# Patient Record
Sex: MALE | Race: BLACK OR AFRICAN AMERICAN | NOT HISPANIC OR LATINO | Employment: OTHER | URBAN - METROPOLITAN AREA
[De-identification: names, ages, dates, MRNs, and addresses within clinical notes are randomized per-mention and may not be internally consistent; named-entity substitution may affect disease eponyms.]

---

## 2023-04-26 LAB
ALANINE AMINOTRANSFERASE (SGPT) (U/L) IN SER/PLAS: 14 U/L (ref 10–52)
ALBUMIN (G/DL) IN SER/PLAS: 4.3 G/DL (ref 3.4–5)
ALKALINE PHOSPHATASE (U/L) IN SER/PLAS: 122 U/L (ref 33–136)
ANION GAP IN SER/PLAS: 9 MMOL/L (ref 10–20)
ASPARTATE AMINOTRANSFERASE (SGOT) (U/L) IN SER/PLAS: 15 U/L (ref 9–39)
BILIRUBIN TOTAL (MG/DL) IN SER/PLAS: 0.6 MG/DL (ref 0–1.2)
CALCIDIOL (25 OH VITAMIN D3) (NG/ML) IN SER/PLAS: 43 NG/ML
CALCIUM (MG/DL) IN SER/PLAS: 10.1 MG/DL (ref 8.6–10.6)
CARBON DIOXIDE, TOTAL (MMOL/L) IN SER/PLAS: 32 MMOL/L (ref 21–32)
CHLORIDE (MMOL/L) IN SER/PLAS: 106 MMOL/L (ref 98–107)
CHOLESTEROL (MG/DL) IN SER/PLAS: 146 MG/DL (ref 0–199)
CHOLESTEROL IN HDL (MG/DL) IN SER/PLAS: 52.7 MG/DL
CHOLESTEROL/HDL RATIO: 2.8
CREATININE (MG/DL) IN SER/PLAS: 1.25 MG/DL (ref 0.5–1.3)
ERYTHROCYTE DISTRIBUTION WIDTH (RATIO) BY AUTOMATED COUNT: 14.6 % (ref 11.5–14.5)
ERYTHROCYTE MEAN CORPUSCULAR HEMOGLOBIN CONCENTRATION (G/DL) BY AUTOMATED: 32.5 G/DL (ref 32–36)
ERYTHROCYTE MEAN CORPUSCULAR VOLUME (FL) BY AUTOMATED COUNT: 91 FL (ref 80–100)
ERYTHROCYTES (10*6/UL) IN BLOOD BY AUTOMATED COUNT: 4.64 X10E12/L (ref 4.5–5.9)
GFR MALE: 63 ML/MIN/1.73M2
GLUCOSE (MG/DL) IN SER/PLAS: 88 MG/DL (ref 74–99)
HEMATOCRIT (%) IN BLOOD BY AUTOMATED COUNT: 42.4 % (ref 41–52)
HEMOGLOBIN (G/DL) IN BLOOD: 13.8 G/DL (ref 13.5–17.5)
LDL: 77 MG/DL (ref 0–99)
LEUKOCYTES (10*3/UL) IN BLOOD BY AUTOMATED COUNT: 6.6 X10E9/L (ref 4.4–11.3)
NRBC (PER 100 WBCS) BY AUTOMATED COUNT: 0 /100 WBC (ref 0–0)
PLATELETS (10*3/UL) IN BLOOD AUTOMATED COUNT: 231 X10E9/L (ref 150–450)
POTASSIUM (MMOL/L) IN SER/PLAS: 4.3 MMOL/L (ref 3.5–5.3)
PROTEIN TOTAL: 7.4 G/DL (ref 6.4–8.2)
SODIUM (MMOL/L) IN SER/PLAS: 143 MMOL/L (ref 136–145)
TRIGLYCERIDE (MG/DL) IN SER/PLAS: 83 MG/DL (ref 0–149)
UREA NITROGEN (MG/DL) IN SER/PLAS: 14 MG/DL (ref 6–23)
VLDL: 17 MG/DL (ref 0–40)

## 2023-11-17 ENCOUNTER — OFFICE VISIT (OUTPATIENT)
Dept: PRIMARY CARE | Facility: CLINIC | Age: 68
End: 2023-11-17
Payer: MEDICARE

## 2023-11-17 VITALS
DIASTOLIC BLOOD PRESSURE: 67 MMHG | OXYGEN SATURATION: 96 % | BODY MASS INDEX: 21.98 KG/M2 | HEIGHT: 68 IN | WEIGHT: 145 LBS | SYSTOLIC BLOOD PRESSURE: 101 MMHG | HEART RATE: 62 BPM

## 2023-11-17 DIAGNOSIS — I10 HYPERTENSION, UNSPECIFIED TYPE: ICD-10-CM

## 2023-11-17 DIAGNOSIS — E78.5 HYPERLIPIDEMIA, UNSPECIFIED HYPERLIPIDEMIA TYPE: ICD-10-CM

## 2023-11-17 DIAGNOSIS — Z00.00 WELLNESS EXAMINATION: Primary | ICD-10-CM

## 2023-11-17 DIAGNOSIS — D64.9 CHRONIC ANEMIA: ICD-10-CM

## 2023-11-17 DIAGNOSIS — N52.9 ERECTILE DYSFUNCTION, UNSPECIFIED ERECTILE DYSFUNCTION TYPE: ICD-10-CM

## 2023-11-17 DIAGNOSIS — I11.9 HYPERTENSIVE HEART DISEASE WITHOUT HEART FAILURE: ICD-10-CM

## 2023-11-17 DIAGNOSIS — F17.200 NICOTINE USE DISORDER: ICD-10-CM

## 2023-11-17 DIAGNOSIS — E55.9 VITAMIN D DEFICIENCY: ICD-10-CM

## 2023-11-17 DIAGNOSIS — F17.210 NICOTINE DEPENDENCE, CIGARETTES, UNCOMPLICATED: ICD-10-CM

## 2023-11-17 DIAGNOSIS — Z00.00 MEDICARE ANNUAL WELLNESS VISIT, SUBSEQUENT: ICD-10-CM

## 2023-11-17 DIAGNOSIS — Z12.5 SCREENING FOR PROSTATE CANCER: ICD-10-CM

## 2023-11-17 PROBLEM — M50.20 HERNIATED CERVICAL DISC: Status: ACTIVE | Noted: 2023-11-17

## 2023-11-17 PROBLEM — H61.23 IMPACTED CERUMEN OF BOTH EARS: Status: ACTIVE | Noted: 2023-11-17

## 2023-11-17 PROBLEM — H90.A21 SENSORINEURAL HEARING LOSS (SNHL) OF RIGHT EAR WITH RESTRICTED HEARING OF LEFT EAR: Status: ACTIVE | Noted: 2023-11-17

## 2023-11-17 PROBLEM — M54.50 LOWER BACK PAIN: Status: ACTIVE | Noted: 2023-11-17

## 2023-11-17 PROCEDURE — G0439 PPPS, SUBSEQ VISIT: HCPCS | Performed by: STUDENT IN AN ORGANIZED HEALTH CARE EDUCATION/TRAINING PROGRAM

## 2023-11-17 PROCEDURE — 1126F AMNT PAIN NOTED NONE PRSNT: CPT | Performed by: STUDENT IN AN ORGANIZED HEALTH CARE EDUCATION/TRAINING PROGRAM

## 2023-11-17 PROCEDURE — 1170F FXNL STATUS ASSESSED: CPT | Performed by: STUDENT IN AN ORGANIZED HEALTH CARE EDUCATION/TRAINING PROGRAM

## 2023-11-17 PROCEDURE — 1159F MED LIST DOCD IN RCRD: CPT | Performed by: STUDENT IN AN ORGANIZED HEALTH CARE EDUCATION/TRAINING PROGRAM

## 2023-11-17 PROCEDURE — 3078F DIAST BP <80 MM HG: CPT | Performed by: STUDENT IN AN ORGANIZED HEALTH CARE EDUCATION/TRAINING PROGRAM

## 2023-11-17 PROCEDURE — 1160F RVW MEDS BY RX/DR IN RCRD: CPT | Performed by: STUDENT IN AN ORGANIZED HEALTH CARE EDUCATION/TRAINING PROGRAM

## 2023-11-17 PROCEDURE — 3074F SYST BP LT 130 MM HG: CPT | Performed by: STUDENT IN AN ORGANIZED HEALTH CARE EDUCATION/TRAINING PROGRAM

## 2023-11-17 PROCEDURE — 99397 PER PM REEVAL EST PAT 65+ YR: CPT | Performed by: STUDENT IN AN ORGANIZED HEALTH CARE EDUCATION/TRAINING PROGRAM

## 2023-11-17 RX ORDER — ATORVASTATIN CALCIUM 40 MG/1
40 TABLET, FILM COATED ORAL DAILY
COMMUNITY
End: 2024-01-27

## 2023-11-17 RX ORDER — DILTIAZEM HYDROCHLORIDE 240 MG/1
240 CAPSULE, COATED, EXTENDED RELEASE ORAL DAILY
COMMUNITY
Start: 2023-07-04 | End: 2024-01-30

## 2023-11-17 RX ORDER — LISINOPRIL 10 MG/1
10 TABLET ORAL DAILY
COMMUNITY
End: 2023-11-17 | Stop reason: SINTOL

## 2023-11-17 ASSESSMENT — ACTIVITIES OF DAILY LIVING (ADL)
MANAGING_FINANCES: INDEPENDENT
DOING_HOUSEWORK: INDEPENDENT
TAKING_MEDICATION: INDEPENDENT
GROCERY_SHOPPING: INDEPENDENT
BATHING: INDEPENDENT
DRESSING: INDEPENDENT

## 2023-11-17 ASSESSMENT — ENCOUNTER SYMPTOMS
OCCASIONAL FEELINGS OF UNSTEADINESS: 0
LOSS OF SENSATION IN FEET: 0
DEPRESSION: 0

## 2023-11-17 ASSESSMENT — PATIENT HEALTH QUESTIONNAIRE - PHQ9
2. FEELING DOWN, DEPRESSED OR HOPELESS: NOT AT ALL
1. LITTLE INTEREST OR PLEASURE IN DOING THINGS: NOT AT ALL
SUM OF ALL RESPONSES TO PHQ9 QUESTIONS 1 AND 2: 0

## 2023-11-17 NOTE — PROGRESS NOTES
"Subjective   Reason for Visit: Hector Otoole Jr. is an 68 y.o. male here for a Medicare Wellness visit.          Reviewed all medications by prescribing practitioner or clinical pharmacist (such as prescriptions, OTCs, herbal therapies and supplements) and documented in the medical record.    HPI    Patient Care Team:  Christopher D'Amico, DO as PCP - General (Family Medicine)     Review of Systems    Objective   Vitals:  /67   Pulse 62   Ht 1.727 m (5' 8\")   Wt 65.8 kg (145 lb)   SpO2 96%   BMI 22.05 kg/m²       Physical Exam    Assessment/Plan   Problem List Items Addressed This Visit    None           HPI: 68-year-old male presenting to establish care, Medicare annual wellness exam/CPE.  Patient doing relatively well without any new concerns or interval changes.    HTN, HLD, hypertensive heart disease, LVH  Following with cardiology.  On minimal medication regimen, has actually been decreasing blood pressure regimen well maintaining good readings.  Denies chest pain or shortness of breath.    Chronic anemia  Stable, asymptomatic    Erectile dysfunction  Not medically treated, reports not having issues.    SocHx:   - Smoking: Approximately 30-pack-year history, still smoking, only several cigarettes a week    Denies HA, changes in vision, chest pain, SOB/KELLEY, palpitations, N/V/D/C, dysuria/hematuria, hematochezia/melena, paresthesias, LE edema.    12 point ROS reviewed and negative other than as stated in HPI      General: Alert, oriented, pleasant, in no acute distress  HEENT:      Head: normocephalic, atraumatic;      eyes: EOMI, no scleral icterus;   Neck: soft, supple, non-tender, no masses appreciated  CV: Heart with regular rate and rhythm, normal S1/S2, no murmurs  Lungs: CTAB without wheezing, rhonchi or rales; good respiratory effort, no increased work of breathing  Abdomen: soft, non-tender, non-distended, no masses appreciated  Extremities: no edema, no cyanosis  MSK: ROM of upper and " lower extremities intact; strength 5/5 upper and lower extremities  Neuro: Cranial nerves grossly intact; alert and oriented, normal gait  Psych: Appropriate mood and affect    #HM  -CBC, CMP, Lipid panel, Vit D, TSH with reflex T4, PSA  -Vaccines:       Flu: declines      Shingrix: Recommended, advised to go to local pharmacy      Pneumococcal:      Tdap: 2020  -Lung cancer screening with low-dose CT: approximately 30 pack years  -Colonoscopy: 2021, 5 year plan  -AAA screening: ordered    #HTN #hypertensive heart disease #LVH  -Continue diltiazem 240 mg daily  - Continue to follow cardiology    #HLD  -Continue atorvastatin 40 mg daily  - Repeat lipid panel    #Chronic anemia  -Repeat CBC    #Erectile dysfunction  -Not medicated, currently stable and content    Smoking and tobacco cessation counseling; 3-10min    I spent more than 3 minutes (but less than 10 minutes) counseling patient about need for smoking/tobacco cessation and how I can support efforts when patient is ready to quit.  Discussed nicotine replacement therapy, Varenicline, Bupropion, hypnosis, support groups, and acupuncture as potential options.  Patient currently has no signs or symptoms of tobacco related disease.    F/U 6-12 months    Chris D'Amico, DO

## 2023-12-13 ENCOUNTER — HOSPITAL ENCOUNTER (OUTPATIENT)
Dept: VASCULAR MEDICINE | Facility: CLINIC | Age: 68
Discharge: HOME | End: 2023-12-13
Payer: MEDICARE

## 2023-12-13 DIAGNOSIS — I71.43 INFRARENAL ABDOMINAL AORTIC ANEURYSM (AAA) WITHOUT RUPTURE (CMS-HCC): Primary | ICD-10-CM

## 2023-12-13 DIAGNOSIS — I71.40 ABDOMINAL AORTIC ANEURYSM, WITHOUT RUPTURE, UNSPECIFIED (CMS-HCC): ICD-10-CM

## 2023-12-13 DIAGNOSIS — F17.200 NICOTINE USE DISORDER: ICD-10-CM

## 2023-12-13 DIAGNOSIS — Z13.6 ENCOUNTER FOR SCREENING FOR CARDIOVASCULAR DISORDERS: ICD-10-CM

## 2023-12-13 PROCEDURE — 76706 US ABDL AORTA SCREEN AAA: CPT | Performed by: SURGERY

## 2023-12-13 PROCEDURE — 76706 US ABDL AORTA SCREEN AAA: CPT

## 2023-12-14 ENCOUNTER — ANCILLARY PROCEDURE (OUTPATIENT)
Dept: RADIOLOGY | Facility: CLINIC | Age: 68
End: 2023-12-14
Payer: MEDICARE

## 2023-12-14 DIAGNOSIS — F17.200 NICOTINE USE DISORDER: ICD-10-CM

## 2023-12-14 DIAGNOSIS — F17.210 NICOTINE DEPENDENCE, CIGARETTES, UNCOMPLICATED: ICD-10-CM

## 2023-12-14 PROCEDURE — 71271 CT THORAX LUNG CANCER SCR C-: CPT

## 2023-12-14 PROCEDURE — 71271 CT THORAX LUNG CANCER SCR C-: CPT | Performed by: RADIOLOGY

## 2023-12-14 NOTE — LETTER
February 5, 2024     Hector ELIDA Otoole Jr.  [address] [facility] [address]  [place] [address]      Dear Mr. Otoole:    Below are the results from your recent visit:      CT showed smoking-related changes, but no worrisome pulmonary nodules.  Advised to continue with annual screenings.     There is a mildly aneurysmal ascending aorta that should be rechecked at next screening.  Further, densely calcified coronary arteries, continue to follow with Dr. Lang and see if further evaluation with stress testing is necessary.       If you have any questions or concerns, please don't hesitate to call.

## 2023-12-15 NOTE — RESULT ENCOUNTER NOTE
Lung cancer screening showed no worrisome pulmonary nodules, though there is mild underlying smoking-related lung disease.  Continue with annual screenings.    Borderline enlarged ascending aorta, densely calcified coronary arteries.  Patient should follow-up with his cardiologist Dr. Lang, to see if further testing is needed such as stress test.  I will defer to cardiology.  He does not have an appointment currently, so please advise him to get scheduled, help if needed.

## 2024-01-17 ENCOUNTER — APPOINTMENT (OUTPATIENT)
Dept: VASCULAR SURGERY | Facility: HOSPITAL | Age: 69
End: 2024-01-17
Payer: MEDICARE

## 2024-01-25 NOTE — RESULT ENCOUNTER NOTE
CT showed smoking-related changes, but no worrisome pulmonary nodules.  Advised to continue with annual screenings.    There is a mildly aneurysmal ascending aorta that should be rechecked at next screening.  Further, densely calcified coronary arteries, continue to follow with Dr. Lang and see if further evaluation with stress testing is necessary.

## 2024-01-26 DIAGNOSIS — E78.5 HYPERLIPIDEMIA, UNSPECIFIED: ICD-10-CM

## 2024-01-27 RX ORDER — ATORVASTATIN CALCIUM 40 MG/1
40 TABLET, FILM COATED ORAL DAILY
Qty: 90 TABLET | Refills: 3 | Status: SHIPPED | OUTPATIENT
Start: 2024-01-27

## 2024-01-30 DIAGNOSIS — I10 ESSENTIAL (PRIMARY) HYPERTENSION: ICD-10-CM

## 2024-01-30 RX ORDER — DILTIAZEM HYDROCHLORIDE 240 MG/1
240 CAPSULE, COATED, EXTENDED RELEASE ORAL DAILY
Qty: 90 CAPSULE | Refills: 3 | Status: SHIPPED | OUTPATIENT
Start: 2024-01-30

## 2024-02-07 ENCOUNTER — APPOINTMENT (OUTPATIENT)
Dept: VASCULAR SURGERY | Facility: HOSPITAL | Age: 69
End: 2024-02-07
Payer: MEDICARE

## 2024-02-13 ENCOUNTER — OFFICE VISIT (OUTPATIENT)
Dept: PRIMARY CARE | Facility: CLINIC | Age: 69
End: 2024-02-13
Payer: MEDICARE

## 2024-02-13 VITALS
DIASTOLIC BLOOD PRESSURE: 76 MMHG | BODY MASS INDEX: 21.98 KG/M2 | SYSTOLIC BLOOD PRESSURE: 124 MMHG | OXYGEN SATURATION: 96 % | WEIGHT: 145 LBS | HEART RATE: 82 BPM | HEIGHT: 68 IN

## 2024-02-13 DIAGNOSIS — I10 HYPERTENSION, UNSPECIFIED TYPE: Primary | ICD-10-CM

## 2024-02-13 DIAGNOSIS — E78.5 HYPERLIPIDEMIA, UNSPECIFIED HYPERLIPIDEMIA TYPE: ICD-10-CM

## 2024-02-13 DIAGNOSIS — D64.9 CHRONIC ANEMIA: ICD-10-CM

## 2024-02-13 DIAGNOSIS — N52.9 ERECTILE DYSFUNCTION, UNSPECIFIED ERECTILE DYSFUNCTION TYPE: ICD-10-CM

## 2024-02-13 PROCEDURE — 3078F DIAST BP <80 MM HG: CPT | Performed by: STUDENT IN AN ORGANIZED HEALTH CARE EDUCATION/TRAINING PROGRAM

## 2024-02-13 PROCEDURE — 1126F AMNT PAIN NOTED NONE PRSNT: CPT | Performed by: STUDENT IN AN ORGANIZED HEALTH CARE EDUCATION/TRAINING PROGRAM

## 2024-02-13 PROCEDURE — 1160F RVW MEDS BY RX/DR IN RCRD: CPT | Performed by: STUDENT IN AN ORGANIZED HEALTH CARE EDUCATION/TRAINING PROGRAM

## 2024-02-13 PROCEDURE — G2211 COMPLEX E/M VISIT ADD ON: HCPCS | Performed by: STUDENT IN AN ORGANIZED HEALTH CARE EDUCATION/TRAINING PROGRAM

## 2024-02-13 PROCEDURE — 3074F SYST BP LT 130 MM HG: CPT | Performed by: STUDENT IN AN ORGANIZED HEALTH CARE EDUCATION/TRAINING PROGRAM

## 2024-02-13 PROCEDURE — 1159F MED LIST DOCD IN RCRD: CPT | Performed by: STUDENT IN AN ORGANIZED HEALTH CARE EDUCATION/TRAINING PROGRAM

## 2024-02-13 PROCEDURE — 99214 OFFICE O/P EST MOD 30 MIN: CPT | Performed by: STUDENT IN AN ORGANIZED HEALTH CARE EDUCATION/TRAINING PROGRAM

## 2024-02-13 ASSESSMENT — PATIENT HEALTH QUESTIONNAIRE - PHQ9
SUM OF ALL RESPONSES TO PHQ9 QUESTIONS 1 AND 2: 0
1. LITTLE INTEREST OR PLEASURE IN DOING THINGS: NOT AT ALL
2. FEELING DOWN, DEPRESSED OR HOPELESS: NOT AT ALL

## 2024-02-13 ASSESSMENT — COLUMBIA-SUICIDE SEVERITY RATING SCALE - C-SSRS
6. HAVE YOU EVER DONE ANYTHING, STARTED TO DO ANYTHING, OR PREPARED TO DO ANYTHING TO END YOUR LIFE?: NO
1. IN THE PAST MONTH, HAVE YOU WISHED YOU WERE DEAD OR WISHED YOU COULD GO TO SLEEP AND NOT WAKE UP?: NO
2. HAVE YOU ACTUALLY HAD ANY THOUGHTS OF KILLING YOURSELF?: NO

## 2024-02-13 ASSESSMENT — ENCOUNTER SYMPTOMS
OCCASIONAL FEELINGS OF UNSTEADINESS: 0
LOSS OF SENSATION IN FEET: 0
DEPRESSION: 0

## 2024-02-13 NOTE — PROGRESS NOTES
69-year-old male presenting for routine follow-up on chronic conditions:    HTN, HLD, hypertensive heart disease, LVH  Following with cardiology.  On minimal medication regimen, and doing well.  Asymptomatic.    Chronic anemia  Stable, asymptomatic    Erectile dysfunction  Not medically treated, reports not having issues.    SocHx:   - Smoking: Approximately 30-pack-year history, still smoking, only several cigarettes a week    Denies HA, changes in vision, chest pain, SOB/KELLEY, palpitations, N/V/D/C, dysuria/hematuria, hematochezia/melena, paresthesias, LE edema.    12 point ROS reviewed and negative other than as stated in HPI      General: Alert, oriented, pleasant, in no acute distress  HEENT:      Head: normocephalic, atraumatic;      eyes: EOMI, no scleral icterus;   Neck: soft, supple, non-tender, no masses appreciated  CV: Heart with regular rate and rhythm, normal S1/S2, no murmurs  Lungs: CTAB without wheezing, rhonchi or rales; good respiratory effort, no increased work of breathing  Extremities: no edema, no cyanosis  Neuro: Cranial nerves grossly intact; alert and oriented, normal gait  Psych: Appropriate mood and affect    #HTN #Hypertensive heart disease #LVH  -BP at goal in office  -Continue diltiazem 240 mg daily  - Continue to follow cardiology    #HLD  -Continue atorvastatin 40 mg daily  - Repeat lipid panel    #Chronic anemia  -Repeat CBC    #Erectile dysfunction  -Not medicated, currently stable and content    F/U follow-up 4-5 months, Medicare in November    Chris D'Amico, DO

## 2024-02-21 ENCOUNTER — LAB (OUTPATIENT)
Dept: LAB | Facility: LAB | Age: 69
End: 2024-02-21
Payer: MEDICARE

## 2024-02-21 ENCOUNTER — OFFICE VISIT (OUTPATIENT)
Dept: VASCULAR SURGERY | Facility: HOSPITAL | Age: 69
End: 2024-02-21
Payer: MEDICARE

## 2024-02-21 VITALS
SYSTOLIC BLOOD PRESSURE: 143 MMHG | WEIGHT: 145.8 LBS | BODY MASS INDEX: 22.88 KG/M2 | HEART RATE: 87 BPM | HEIGHT: 67 IN | DIASTOLIC BLOOD PRESSURE: 83 MMHG

## 2024-02-21 DIAGNOSIS — I11.9 HYPERTENSIVE HEART DISEASE WITHOUT HEART FAILURE: ICD-10-CM

## 2024-02-21 DIAGNOSIS — Z12.5 SCREENING FOR PROSTATE CANCER: ICD-10-CM

## 2024-02-21 DIAGNOSIS — D64.9 CHRONIC ANEMIA: ICD-10-CM

## 2024-02-21 DIAGNOSIS — N52.9 ERECTILE DYSFUNCTION, UNSPECIFIED ERECTILE DYSFUNCTION TYPE: ICD-10-CM

## 2024-02-21 DIAGNOSIS — I10 HYPERTENSION, UNSPECIFIED TYPE: ICD-10-CM

## 2024-02-21 DIAGNOSIS — I71.43 INFRARENAL ABDOMINAL AORTIC ANEURYSM (AAA) WITHOUT RUPTURE (CMS-HCC): ICD-10-CM

## 2024-02-21 DIAGNOSIS — E78.5 HYPERLIPIDEMIA, UNSPECIFIED HYPERLIPIDEMIA TYPE: ICD-10-CM

## 2024-02-21 DIAGNOSIS — F17.200 NICOTINE USE DISORDER: ICD-10-CM

## 2024-02-21 DIAGNOSIS — E55.9 VITAMIN D DEFICIENCY: ICD-10-CM

## 2024-02-21 LAB
25(OH)D3 SERPL-MCNC: 36 NG/ML (ref 30–100)
ALBUMIN SERPL BCP-MCNC: 4.5 G/DL (ref 3.4–5)
ALP SERPL-CCNC: 123 U/L (ref 33–136)
ALT SERPL W P-5'-P-CCNC: 13 U/L (ref 10–52)
ANION GAP SERPL CALC-SCNC: 13 MMOL/L (ref 10–20)
AST SERPL W P-5'-P-CCNC: 14 U/L (ref 9–39)
BILIRUB SERPL-MCNC: 0.7 MG/DL (ref 0–1.2)
BUN SERPL-MCNC: 14 MG/DL (ref 6–23)
CALCIUM SERPL-MCNC: 10.1 MG/DL (ref 8.6–10.6)
CHLORIDE SERPL-SCNC: 108 MMOL/L (ref 98–107)
CHOLEST SERPL-MCNC: 150 MG/DL (ref 0–199)
CHOLESTEROL/HDL RATIO: 2.5
CO2 SERPL-SCNC: 27 MMOL/L (ref 21–32)
CREAT SERPL-MCNC: 1.03 MG/DL (ref 0.5–1.3)
EGFRCR SERPLBLD CKD-EPI 2021: 79 ML/MIN/1.73M*2
ERYTHROCYTE [DISTWIDTH] IN BLOOD BY AUTOMATED COUNT: 15.9 % (ref 11.5–14.5)
GLUCOSE SERPL-MCNC: 83 MG/DL (ref 74–99)
HCT VFR BLD AUTO: 44.4 % (ref 41–52)
HDLC SERPL-MCNC: 59.9 MG/DL
HGB BLD-MCNC: 14 G/DL (ref 13.5–17.5)
LDLC SERPL CALC-MCNC: 76 MG/DL
MCH RBC QN AUTO: 29.7 PG (ref 26–34)
MCHC RBC AUTO-ENTMCNC: 31.5 G/DL (ref 32–36)
MCV RBC AUTO: 94 FL (ref 80–100)
NON HDL CHOLESTEROL: 90 MG/DL (ref 0–149)
NRBC BLD-RTO: 0 /100 WBCS (ref 0–0)
PLATELET # BLD AUTO: 255 X10*3/UL (ref 150–450)
POTASSIUM SERPL-SCNC: 4.2 MMOL/L (ref 3.5–5.3)
PROT SERPL-MCNC: 7.6 G/DL (ref 6.4–8.2)
PSA SERPL-MCNC: 0.76 NG/ML
RBC # BLD AUTO: 4.71 X10*6/UL (ref 4.5–5.9)
SODIUM SERPL-SCNC: 144 MMOL/L (ref 136–145)
TRIGL SERPL-MCNC: 71 MG/DL (ref 0–149)
TSH SERPL-ACNC: 1.1 MIU/L (ref 0.44–3.98)
VLDL: 14 MG/DL (ref 0–40)
WBC # BLD AUTO: 7.1 X10*3/UL (ref 4.4–11.3)

## 2024-02-21 PROCEDURE — 99214 OFFICE O/P EST MOD 30 MIN: CPT | Performed by: SURGERY

## 2024-02-21 PROCEDURE — 80053 COMPREHEN METABOLIC PANEL: CPT

## 2024-02-21 PROCEDURE — 3079F DIAST BP 80-89 MM HG: CPT | Performed by: SURGERY

## 2024-02-21 PROCEDURE — 99204 OFFICE O/P NEW MOD 45 MIN: CPT | Performed by: SURGERY

## 2024-02-21 PROCEDURE — 1160F RVW MEDS BY RX/DR IN RCRD: CPT | Performed by: SURGERY

## 2024-02-21 PROCEDURE — 1126F AMNT PAIN NOTED NONE PRSNT: CPT | Performed by: SURGERY

## 2024-02-21 PROCEDURE — 1159F MED LIST DOCD IN RCRD: CPT | Performed by: SURGERY

## 2024-02-21 PROCEDURE — 85027 COMPLETE CBC AUTOMATED: CPT

## 2024-02-21 PROCEDURE — 3077F SYST BP >= 140 MM HG: CPT | Performed by: SURGERY

## 2024-02-21 PROCEDURE — 84443 ASSAY THYROID STIM HORMONE: CPT

## 2024-02-21 PROCEDURE — 36415 COLL VENOUS BLD VENIPUNCTURE: CPT

## 2024-02-21 PROCEDURE — G0103 PSA SCREENING: HCPCS

## 2024-02-21 PROCEDURE — 80061 LIPID PANEL: CPT

## 2024-02-21 PROCEDURE — 82306 VITAMIN D 25 HYDROXY: CPT

## 2024-02-21 ASSESSMENT — COLUMBIA-SUICIDE SEVERITY RATING SCALE - C-SSRS
2. HAVE YOU ACTUALLY HAD ANY THOUGHTS OF KILLING YOURSELF?: NO
6. HAVE YOU EVER DONE ANYTHING, STARTED TO DO ANYTHING, OR PREPARED TO DO ANYTHING TO END YOUR LIFE?: NO
1. IN THE PAST MONTH, HAVE YOU WISHED YOU WERE DEAD OR WISHED YOU COULD GO TO SLEEP AND NOT WAKE UP?: NO

## 2024-02-21 ASSESSMENT — PAIN SCALES - GENERAL: PAINLEVEL: 0-NO PAIN

## 2024-02-21 NOTE — PROGRESS NOTES
Vascular Surgery Clinic Note    CC: AAA    HPI:  Hector Otoole Jr. is 69 y.o. male with history of smoking (active 6-7 cig/day). He had a screening AAA duplex that showed 3.2cm AAA. He has no abdominal or back pain. He has had only knee surgery in the past. He is retired. He can walk miles without pain or SOB.     Medical History:   has a past medical history of Essential (primary) hypertension (06/22/2022).    Meds:   Current Outpatient Medications on File Prior to Visit   Medication Sig Dispense Refill    atorvastatin (Lipitor) 40 mg tablet TAKE 1 TABLET BY MOUTH EVERY DAY 90 tablet 3    dilTIAZem CD (Cardizem CD) 240 mg 24 hr capsule TAKE 1 CAPSULE BY MOUTH EVERY DAY 90 capsule 3     No current facility-administered medications on file prior to visit.        Allergies:   Allergies   Allergen Reactions    Lisinopril Swelling       SH:    Social Determinants of Health     Tobacco Use: High Risk (2/13/2024)    Patient History     Smoking Tobacco Use: Some Days     Smokeless Tobacco Use: Never     Passive Exposure: Not on file   Alcohol Use: Not on file   Financial Resource Strain: Not on file   Food Insecurity: Not on file   Transportation Needs: Not on file   Physical Activity: Not on file   Stress: Not on file   Social Connections: Not on file   Intimate Partner Violence: Not on file   Depression: Not at risk (2/21/2024)    PHQ-2     PHQ-2 Score: 0   Housing Stability: Not on file   Utilities: Not on file   Digital Equity: Not on file        FH:  No family history on file.     ROS:  All systems were reviewed and are negative except as per HPI.    Objective:  Vitals:  Vitals:    02/21/24 1130   BP: 143/83   Pulse: 87        Exam:  In NAD, well appearing  Abd Soft, ND/NT  Vascular examination:  Palpale femoral and popliteal pulses. Pedal pulses are weak.    Assessment & Plan:  Hector Otoole Jr. is 69 y.o. male with small AAA. Rtc yearly for surveillance.      I spent a total of 45 minutes on the day of the  visit.         Eliezer Bob M.D.

## 2024-02-27 ENCOUNTER — TELEPHONE (OUTPATIENT)
Dept: PRIMARY CARE | Facility: CLINIC | Age: 69
End: 2024-02-27
Payer: MEDICARE

## 2024-02-27 NOTE — TELEPHONE ENCOUNTER
----- Message from Christopher D'Amico, DO sent at 2/22/2024 10:00 AM EST -----  Labs essentially unremarkable.

## 2024-02-27 NOTE — TELEPHONE ENCOUNTER
Result Communication    Resulted Orders   CBC   Result Value Ref Range    WBC 7.1 4.4 - 11.3 x10*3/uL    nRBC 0.0 0.0 - 0.0 /100 WBCs    RBC 4.71 4.50 - 5.90 x10*6/uL    Hemoglobin 14.0 13.5 - 17.5 g/dL    Hematocrit 44.4 41.0 - 52.0 %    MCV 94 80 - 100 fL    MCH 29.7 26.0 - 34.0 pg    MCHC 31.5 (L) 32.0 - 36.0 g/dL    RDW 15.9 (H) 11.5 - 14.5 %    Platelets 255 150 - 450 x10*3/uL   Lipid Panel   Result Value Ref Range    Cholesterol 150 0 - 199 mg/dL      Comment:            Age      Desirable   Borderline High   High     0-19 Y     0 - 169       170 - 199     >/= 200    20-24 Y     0 - 189       190 - 224     >/= 225         >24 Y     0 - 199       200 - 239     >/= 240   **All ranges are based on fasting samples. Specific   therapeutic targets will vary based on patient-specific   cardiac risk.    Pediatric guidelines reference:Pediatrics 2011, 128(S5).Adult guidelines reference: NCEP ATPIII Guidelines,RC 2001, 258:2486-97    Venipuncture immediately after or during the administration of Metamizole may lead to falsely low results. Testing should be performed immediately prior to Metamizole dosing.    HDL-Cholesterol 59.9 mg/dL      Comment:        Age       Very Low   Low     Normal    High    0-19 Y    < 35      < 40     40-45     ----  20-24 Y    ----     < 40      >45      ----        >24 Y      ----     < 40     40-60      >60      Cholesterol/HDL Ratio 2.5       Comment:        Ref Values  Desirable  < 3.4  High Risk  > 5.0    LDL Calculated 76 <=99 mg/dL      Comment:                                  Near   Borderline      AGE      Desirable  Optimal    High     High     Very High     0-19 Y     0 - 109     ---    110-129   >/= 130     ----    20-24 Y     0 - 119     ---    120-159   >/= 160     ----      >24 Y     0 -  99   100-129  130-159   160-189     >/=190      VLDL 14 0 - 40 mg/dL    Triglycerides 71 0 - 149 mg/dL      Comment:         Age         Desirable   Borderline High   High     Very High    0 D-90 D    19 - 174         ----         ----        ----  91 D- 9 Y     0 -  74        75 -  99     >/= 100      ----    10-19 Y     0 -  89        90 - 129     >/= 130      ----    20-24 Y     0 - 114       115 - 149     >/= 150      ----         >24 Y     0 - 149       150 - 199    200- 499    >/= 500    Venipuncture immediately after or during the administration of Metamizole may lead to falsely low results. Testing should be performed immediately prior to Metamizole dosing.    Non HDL Cholesterol 90 0 - 149 mg/dL      Comment:            Age       Desirable   Borderline High   High     Very High     0-19 Y     0 - 119       120 - 144     >/= 145    >/= 160    20-24 Y     0 - 149       150 - 189     >/= 190      ----         >24 Y    30 mg/dL above LDL Cholesterol goal     Comprehensive Metabolic Panel   Result Value Ref Range    Glucose 83 74 - 99 mg/dL    Sodium 144 136 - 145 mmol/L    Potassium 4.2 3.5 - 5.3 mmol/L    Chloride 108 (H) 98 - 107 mmol/L    Bicarbonate 27 21 - 32 mmol/L    Anion Gap 13 10 - 20 mmol/L    Urea Nitrogen 14 6 - 23 mg/dL    Creatinine 1.03 0.50 - 1.30 mg/dL    eGFR 79 >60 mL/min/1.73m*2      Comment:      Calculations of estimated GFR are performed using the 2021 CKD-EPI Study Refit equation without the race variable for the IDMS-Traceable creatinine methods.  https://jasn.asnjournals.org/content/early/2021/09/22/ASN.2815136301    Calcium 10.1 8.6 - 10.6 mg/dL    Albumin 4.5 3.4 - 5.0 g/dL    Alkaline Phosphatase 123 33 - 136 U/L    Total Protein 7.6 6.4 - 8.2 g/dL    AST 14 9 - 39 U/L    Bilirubin, Total 0.7 0.0 - 1.2 mg/dL    ALT 13 10 - 52 U/L      Comment:      Patients treated with Sulfasalazine may generate falsely decreased results for ALT.   TSH with reflex to Free T4 if abnormal   Result Value Ref Range    Thyroid Stimulating Hormone 1.10 0.44 - 3.98 mIU/L    Narrative    TSH testing is performed using different testing methodology at [facility] than at other  [facility]. Direct result comparisons should only be made within the same method.     Vitamin D 25-Hydroxy,Total (for eval of Vitamin D levels)   Result Value Ref Range    Vitamin D, 25-Hydroxy, Total 36 30 - 100 ng/mL    Narrative    Deficiency:         < 20   ng/ml  Insufficiency:      20-29  ng/ml  Sufficiency:         ng/ml  This assay accurately quantifies the sum of Vitamin D3, 25-Hydroxy and Vitamin D2,25-Hydroxy.   Prostate Specific Antigen   Result Value Ref Range    Prostate Specific AG 0.76 <=4.00 ng/mL    Narrative    The FDA requires that the method used for PSA assay be reported to the physician. Values obtained with different assay methods must not be used interchangeably. This test was performed at [facility] using Siemens OnetoOnetext PSA method, which is a sandwich immunoassay using chemiluminescence for quantitation. The assay is approved for measurement of prostate-specific antigen (PSA) in serum and may be used in conjunction with a digital rectal examination in men 50 years and older as an aid in the detection of prostate cancer. 5 Alpha-reductase inhibitors (e.g., Proscar, Finasteride, Avodart, Dutasteride, and Aishwarya) for the treatment of BPH have been shown to lower PSA levels by an average of 50% after 6 months of treatment.            3:22 PM      Results were not successfully communicated with the patient and they did not acknowledge their understanding.

## 2024-07-16 ENCOUNTER — LAB (OUTPATIENT)
Dept: LAB | Facility: LAB | Age: 69
End: 2024-07-16
Payer: MEDICARE

## 2024-07-16 ENCOUNTER — APPOINTMENT (OUTPATIENT)
Dept: PRIMARY CARE | Facility: CLINIC | Age: 69
End: 2024-07-16
Payer: MEDICARE

## 2024-07-16 VITALS
OXYGEN SATURATION: 95 % | HEIGHT: 67 IN | WEIGHT: 148 LBS | SYSTOLIC BLOOD PRESSURE: 115 MMHG | HEART RATE: 63 BPM | BODY MASS INDEX: 23.23 KG/M2 | DIASTOLIC BLOOD PRESSURE: 71 MMHG

## 2024-07-16 DIAGNOSIS — R80.9 PROTEINURIA, UNSPECIFIED TYPE: ICD-10-CM

## 2024-07-16 DIAGNOSIS — E78.5 HYPERLIPIDEMIA, UNSPECIFIED HYPERLIPIDEMIA TYPE: ICD-10-CM

## 2024-07-16 DIAGNOSIS — N52.9 ERECTILE DYSFUNCTION, UNSPECIFIED ERECTILE DYSFUNCTION TYPE: ICD-10-CM

## 2024-07-16 DIAGNOSIS — D64.9 CHRONIC ANEMIA: ICD-10-CM

## 2024-07-16 DIAGNOSIS — I10 HYPERTENSION, UNSPECIFIED TYPE: ICD-10-CM

## 2024-07-16 DIAGNOSIS — I10 HYPERTENSION, UNSPECIFIED TYPE: Primary | ICD-10-CM

## 2024-07-16 PROBLEM — F17.200 NICOTINE DEPENDENCE: Status: ACTIVE | Noted: 2023-12-13

## 2024-07-16 PROBLEM — H61.20 IMPACTED CERUMEN: Status: ACTIVE | Noted: 2024-07-16

## 2024-07-16 PROBLEM — I71.40 ABDOMINAL AORTIC ANEURYSM (AAA) WITHOUT RUPTURE (CMS-HCC): Status: ACTIVE | Noted: 2023-12-13

## 2024-07-16 PROBLEM — H90.5 SENSORINEURAL HEARING LOSS (SNHL): Status: ACTIVE | Noted: 2024-07-16

## 2024-07-16 PROBLEM — R22.9 MASS OF SKIN: Status: ACTIVE | Noted: 2024-07-16

## 2024-07-16 PROBLEM — L02.511 ABSCESS OF RIGHT HAND: Status: ACTIVE | Noted: 2024-07-16

## 2024-07-16 PROBLEM — T14.8XXA PUNCTURE WOUND: Status: ACTIVE | Noted: 2024-07-16

## 2024-07-16 PROBLEM — H90.8 MIXED CONDUCTIVE AND SENSORINEURAL HEARING LOSS: Status: ACTIVE | Noted: 2024-07-16

## 2024-07-16 PROBLEM — M67.449 DIGITAL MUCOUS CYST: Status: ACTIVE | Noted: 2024-07-16

## 2024-07-16 LAB
ALBUMIN SERPL BCP-MCNC: 4.3 G/DL (ref 3.4–5)
ALP SERPL-CCNC: 120 U/L (ref 33–136)
ALT SERPL W P-5'-P-CCNC: 12 U/L (ref 10–52)
ANION GAP SERPL CALC-SCNC: 13 MMOL/L (ref 10–20)
AST SERPL W P-5'-P-CCNC: 15 U/L (ref 9–39)
BILIRUB SERPL-MCNC: 0.5 MG/DL (ref 0–1.2)
BUN SERPL-MCNC: 16 MG/DL (ref 6–23)
CALCIUM SERPL-MCNC: 9.9 MG/DL (ref 8.6–10.6)
CHLORIDE SERPL-SCNC: 105 MMOL/L (ref 98–107)
CHOLEST SERPL-MCNC: 127 MG/DL (ref 0–199)
CHOLESTEROL/HDL RATIO: 2.7
CO2 SERPL-SCNC: 29 MMOL/L (ref 21–32)
CREAT SERPL-MCNC: 1.12 MG/DL (ref 0.5–1.3)
CREAT UR-MCNC: 226.8 MG/DL (ref 20–370)
EGFRCR SERPLBLD CKD-EPI 2021: 71 ML/MIN/1.73M*2
ERYTHROCYTE [DISTWIDTH] IN BLOOD BY AUTOMATED COUNT: 15.2 % (ref 11.5–14.5)
GLUCOSE SERPL-MCNC: 85 MG/DL (ref 74–99)
HCT VFR BLD AUTO: 42.3 % (ref 41–52)
HDLC SERPL-MCNC: 47.4 MG/DL
HGB BLD-MCNC: 13.9 G/DL (ref 13.5–17.5)
LDLC SERPL CALC-MCNC: 70 MG/DL
MCH RBC QN AUTO: 29.8 PG (ref 26–34)
MCHC RBC AUTO-ENTMCNC: 32.9 G/DL (ref 32–36)
MCV RBC AUTO: 91 FL (ref 80–100)
MICROALBUMIN UR-MCNC: 22.7 MG/L
MICROALBUMIN/CREAT UR: 10 UG/MG CREAT
NON HDL CHOLESTEROL: 80 MG/DL (ref 0–149)
NRBC BLD-RTO: 0 /100 WBCS (ref 0–0)
PLATELET # BLD AUTO: 248 X10*3/UL (ref 150–450)
POTASSIUM SERPL-SCNC: 4.7 MMOL/L (ref 3.5–5.3)
PROT SERPL-MCNC: 7 G/DL (ref 6.4–8.2)
RBC # BLD AUTO: 4.67 X10*6/UL (ref 4.5–5.9)
SODIUM SERPL-SCNC: 142 MMOL/L (ref 136–145)
TRIGL SERPL-MCNC: 49 MG/DL (ref 0–149)
VLDL: 10 MG/DL (ref 0–40)
WBC # BLD AUTO: 5.7 X10*3/UL (ref 4.4–11.3)

## 2024-07-16 PROCEDURE — 1159F MED LIST DOCD IN RCRD: CPT | Performed by: STUDENT IN AN ORGANIZED HEALTH CARE EDUCATION/TRAINING PROGRAM

## 2024-07-16 PROCEDURE — 3078F DIAST BP <80 MM HG: CPT | Performed by: STUDENT IN AN ORGANIZED HEALTH CARE EDUCATION/TRAINING PROGRAM

## 2024-07-16 PROCEDURE — 36415 COLL VENOUS BLD VENIPUNCTURE: CPT

## 2024-07-16 PROCEDURE — 99214 OFFICE O/P EST MOD 30 MIN: CPT | Performed by: STUDENT IN AN ORGANIZED HEALTH CARE EDUCATION/TRAINING PROGRAM

## 2024-07-16 PROCEDURE — 82043 UR ALBUMIN QUANTITATIVE: CPT

## 2024-07-16 PROCEDURE — 80053 COMPREHEN METABOLIC PANEL: CPT

## 2024-07-16 PROCEDURE — 3074F SYST BP LT 130 MM HG: CPT | Performed by: STUDENT IN AN ORGANIZED HEALTH CARE EDUCATION/TRAINING PROGRAM

## 2024-07-16 PROCEDURE — 80061 LIPID PANEL: CPT

## 2024-07-16 PROCEDURE — G2211 COMPLEX E/M VISIT ADD ON: HCPCS | Performed by: STUDENT IN AN ORGANIZED HEALTH CARE EDUCATION/TRAINING PROGRAM

## 2024-07-16 PROCEDURE — 82570 ASSAY OF URINE CREATININE: CPT

## 2024-07-16 PROCEDURE — 1160F RVW MEDS BY RX/DR IN RCRD: CPT | Performed by: STUDENT IN AN ORGANIZED HEALTH CARE EDUCATION/TRAINING PROGRAM

## 2024-07-16 PROCEDURE — 85027 COMPLETE CBC AUTOMATED: CPT

## 2024-07-16 ASSESSMENT — ENCOUNTER SYMPTOMS
LOSS OF SENSATION IN FEET: 0
DEPRESSION: 0
OCCASIONAL FEELINGS OF UNSTEADINESS: 0

## 2024-07-16 NOTE — PROGRESS NOTES
69-year-old male presenting for routine follow-up on chronic conditions:    HTN, HLD, hypertensive heart disease, LVH  Following with cardiology.  On minimal medication regimen, and doing well.  Asymptomatic.    Chronic anemia  Stable, asymptomatic    Erectile dysfunction  Not medically treated, reports not having issues.    SocHx:   - Smoking: Approximately 30-pack-year history, still smoking, only several cigarettes a week    12 point ROS reviewed and negative other than as stated in HPI      General: Alert, oriented, pleasant, in no acute distress  HEENT:      Head: normocephalic, atraumatic;      eyes: EOMI, no scleral icterus;   Neck: soft, supple, non-tender, no masses appreciated  CV: Heart with regular rate and rhythm, normal S1/S2, no murmurs  Lungs: CTAB without wheezing, rhonchi or rales; good respiratory effort, no increased work of breathing  Neuro: Cranial nerves grossly intact; alert and oriented, normal gait  Psych: Appropriate mood and affect    #HTN #Hypertensive heart disease #LVH  -BP at goal in office  -Continue diltiazem 240 mg daily  - Continue to follow cardiology    #HLD  -Continue atorvastatin 40 mg daily  - Repeat lipid panel    #Chronic anemia  -Repeat CBC    #Erectile dysfunction  -Not medicated, currently stable and content    F/U follow-up 4-5 months, Medicare in November    Chris D'Amico, DO

## 2024-07-17 ENCOUNTER — TELEPHONE (OUTPATIENT)
Dept: PRIMARY CARE | Facility: CLINIC | Age: 69
End: 2024-07-17
Payer: MEDICARE

## 2024-07-17 NOTE — TELEPHONE ENCOUNTER
Result Communication    Resulted Orders   CBC   Result Value Ref Range    WBC 5.7 4.4 - 11.3 x10*3/uL    nRBC 0.0 0.0 - 0.0 /100 WBCs    RBC 4.67 4.50 - 5.90 x10*6/uL    Hemoglobin 13.9 13.5 - 17.5 g/dL    Hematocrit 42.3 41.0 - 52.0 %    MCV 91 80 - 100 fL    MCH 29.8 26.0 - 34.0 pg    MCHC 32.9 32.0 - 36.0 g/dL    RDW 15.2 (H) 11.5 - 14.5 %    Platelets 248 150 - 450 x10*3/uL   Comprehensive Metabolic Panel   Result Value Ref Range    Glucose 85 74 - 99 mg/dL    Sodium 142 136 - 145 mmol/L    Potassium 4.7 3.5 - 5.3 mmol/L    Chloride 105 98 - 107 mmol/L    Bicarbonate 29 21 - 32 mmol/L    Anion Gap 13 10 - 20 mmol/L    Urea Nitrogen 16 6 - 23 mg/dL    Creatinine 1.12 0.50 - 1.30 mg/dL    eGFR 71 >60 mL/min/1.73m*2      Comment:      Calculations of estimated GFR are performed using the 2021 CKD-EPI Study Refit equation without the race variable for the IDMS-Traceable creatinine methods.  https://jasn.asnjournals.org/content/early/2021/09/22/ASN.7756423164    Calcium 9.9 8.6 - 10.6 mg/dL    Albumin 4.3 3.4 - 5.0 g/dL    Alkaline Phosphatase 120 33 - 136 U/L    Total Protein 7.0 6.4 - 8.2 g/dL    AST 15 9 - 39 U/L    Bilirubin, Total 0.5 0.0 - 1.2 mg/dL    ALT 12 10 - 52 U/L      Comment:      Patients treated with Sulfasalazine may generate falsely decreased results for ALT.   Lipid Panel   Result Value Ref Range    Cholesterol 127 0 - 199 mg/dL      Comment:            Age      Desirable   Borderline High   High     0-19 Y     0 - 169       170 - 199     >/= 200    20-24 Y     0 - 189       190 - 224     >/= 225         >24 Y     0 - 199       200 - 239     >/= 240   **All ranges are based on fasting samples. Specific   therapeutic targets will vary based on patient-specific   cardiac risk.    Pediatric guidelines reference:Pediatrics 2011, 128(S5).Adult guidelines reference: NCEP ATPIII Guidelines,RC 2001, 258:2486-97    Venipuncture immediately after or during the administration of Metamizole may lead to  falsely low results. Testing should be performed immediately prior to Metamizole dosing.    HDL-Cholesterol 47.4 mg/dL      Comment:        Age       Very Low   Low     Normal    High    0-19 Y    < 35      < 40     40-45     ----  20-24 Y    ----     < 40      >45      ----        >24 Y      ----     < 40     40-60      >60      Cholesterol/HDL Ratio 2.7       Comment:        Ref Values  Desirable  < 3.4  High Risk  > 5.0    LDL Calculated 70 <=99 mg/dL      Comment:                                  Near   Borderline      AGE      Desirable  Optimal    High     High     Very High     0-19 Y     0 - 109     ---    110-129   >/= 130     ----    20-24 Y     0 - 119     ---    120-159   >/= 160     ----      >24 Y     0 -  99   100-129  130-159   160-189     >/=190      VLDL 10 0 - 40 mg/dL    Triglycerides 49 0 - 149 mg/dL      Comment:         Age         Desirable   Borderline High   High     Very High   0 D-90 D    19 - 174         ----         ----        ----  91 D- 9 Y     0 -  74        75 -  99     >/= 100      ----    10-19 Y     0 -  89        90 - 129     >/= 130      ----    20-24 Y     0 - 114       115 - 149     >/= 150      ----         >24 Y     0 - 149       150 - 199    200- 499    >/= 500    Venipuncture immediately after or during the administration of Metamizole may lead to falsely low results. Testing should be performed immediately prior to Metamizole dosing.    Non HDL Cholesterol 80 0 - 149 mg/dL      Comment:            Age       Desirable   Borderline High   High     Very High     0-19 Y     0 - 119       120 - 144     >/= 145    >/= 160    20-24 Y     0 - 149       150 - 189     >/= 190      ----         >24 Y    30 mg/dL above LDL Cholesterol goal     Albumin-Creatinine Ratio, Urine Random   Result Value Ref Range    Albumin, Urine Random 22.7 Not established mg/L    Creatinine, Urine Random 226.8 20.0 - 370.0 mg/dL    Albumin/Creatinine Ratio 10.0 <30.0 ug/mg Creat       2:36  PM      Results were not successfully communicated with the patient and they did not acknowledge their understanding.

## 2024-07-17 NOTE — TELEPHONE ENCOUNTER
----- Message from Christopher D'Amico sent at 7/17/2024 12:42 PM EDT -----  Labs essentially unremarkable.

## 2024-11-14 ENCOUNTER — APPOINTMENT (OUTPATIENT)
Dept: PRIMARY CARE | Facility: CLINIC | Age: 69
End: 2024-11-14
Payer: MEDICARE

## 2024-11-14 VITALS
BODY MASS INDEX: 23.39 KG/M2 | HEART RATE: 67 BPM | OXYGEN SATURATION: 97 % | WEIGHT: 149 LBS | SYSTOLIC BLOOD PRESSURE: 120 MMHG | DIASTOLIC BLOOD PRESSURE: 76 MMHG | HEIGHT: 67 IN

## 2024-11-14 DIAGNOSIS — N52.9 ERECTILE DYSFUNCTION, UNSPECIFIED ERECTILE DYSFUNCTION TYPE: ICD-10-CM

## 2024-11-14 DIAGNOSIS — Z00.00 WELLNESS EXAMINATION: ICD-10-CM

## 2024-11-14 DIAGNOSIS — F17.211 NICOTINE DEPENDENCE, CIGARETTES, IN REMISSION: ICD-10-CM

## 2024-11-14 DIAGNOSIS — I25.10 CORONARY ARTERY DISEASE INVOLVING NATIVE HEART WITHOUT ANGINA PECTORIS, UNSPECIFIED VESSEL OR LESION TYPE: ICD-10-CM

## 2024-11-14 DIAGNOSIS — F17.200 NICOTINE USE DISORDER: ICD-10-CM

## 2024-11-14 DIAGNOSIS — E55.9 VITAMIN D DEFICIENCY: ICD-10-CM

## 2024-11-14 DIAGNOSIS — Z00.00 MEDICARE ANNUAL WELLNESS VISIT, SUBSEQUENT: ICD-10-CM

## 2024-11-14 DIAGNOSIS — I10 HYPERTENSION, UNSPECIFIED TYPE: Primary | ICD-10-CM

## 2024-11-14 DIAGNOSIS — I11.9 HYPERTENSIVE HEART DISEASE WITHOUT HEART FAILURE: ICD-10-CM

## 2024-11-14 DIAGNOSIS — D64.9 CHRONIC ANEMIA: ICD-10-CM

## 2024-11-14 DIAGNOSIS — Z12.5 SCREENING FOR PROSTATE CANCER: ICD-10-CM

## 2024-11-14 DIAGNOSIS — E78.5 HYPERLIPIDEMIA, UNSPECIFIED HYPERLIPIDEMIA TYPE: ICD-10-CM

## 2024-11-14 PROCEDURE — 1170F FXNL STATUS ASSESSED: CPT | Performed by: STUDENT IN AN ORGANIZED HEALTH CARE EDUCATION/TRAINING PROGRAM

## 2024-11-14 PROCEDURE — 90677 PCV20 VACCINE IM: CPT | Performed by: STUDENT IN AN ORGANIZED HEALTH CARE EDUCATION/TRAINING PROGRAM

## 2024-11-14 PROCEDURE — G0009 ADMIN PNEUMOCOCCAL VACCINE: HCPCS | Performed by: STUDENT IN AN ORGANIZED HEALTH CARE EDUCATION/TRAINING PROGRAM

## 2024-11-14 PROCEDURE — 99397 PER PM REEVAL EST PAT 65+ YR: CPT | Performed by: STUDENT IN AN ORGANIZED HEALTH CARE EDUCATION/TRAINING PROGRAM

## 2024-11-14 PROCEDURE — 3008F BODY MASS INDEX DOCD: CPT | Performed by: STUDENT IN AN ORGANIZED HEALTH CARE EDUCATION/TRAINING PROGRAM

## 2024-11-14 PROCEDURE — 3078F DIAST BP <80 MM HG: CPT | Performed by: STUDENT IN AN ORGANIZED HEALTH CARE EDUCATION/TRAINING PROGRAM

## 2024-11-14 PROCEDURE — 1124F ACP DISCUSS-NO DSCNMKR DOCD: CPT | Performed by: STUDENT IN AN ORGANIZED HEALTH CARE EDUCATION/TRAINING PROGRAM

## 2024-11-14 PROCEDURE — 3074F SYST BP LT 130 MM HG: CPT | Performed by: STUDENT IN AN ORGANIZED HEALTH CARE EDUCATION/TRAINING PROGRAM

## 2024-11-14 PROCEDURE — G0439 PPPS, SUBSEQ VISIT: HCPCS | Performed by: STUDENT IN AN ORGANIZED HEALTH CARE EDUCATION/TRAINING PROGRAM

## 2024-11-14 PROCEDURE — 1159F MED LIST DOCD IN RCRD: CPT | Performed by: STUDENT IN AN ORGANIZED HEALTH CARE EDUCATION/TRAINING PROGRAM

## 2024-11-14 PROCEDURE — 1160F RVW MEDS BY RX/DR IN RCRD: CPT | Performed by: STUDENT IN AN ORGANIZED HEALTH CARE EDUCATION/TRAINING PROGRAM

## 2024-11-14 PROCEDURE — 99214 OFFICE O/P EST MOD 30 MIN: CPT | Performed by: STUDENT IN AN ORGANIZED HEALTH CARE EDUCATION/TRAINING PROGRAM

## 2024-11-14 ASSESSMENT — ENCOUNTER SYMPTOMS
LOSS OF SENSATION IN FEET: 0
OCCASIONAL FEELINGS OF UNSTEADINESS: 0
DEPRESSION: 0

## 2024-11-14 ASSESSMENT — PATIENT HEALTH QUESTIONNAIRE - PHQ9
SUM OF ALL RESPONSES TO PHQ9 QUESTIONS 1 AND 2: 0
2. FEELING DOWN, DEPRESSED OR HOPELESS: NOT AT ALL
1. LITTLE INTEREST OR PLEASURE IN DOING THINGS: NOT AT ALL

## 2024-11-14 ASSESSMENT — ACTIVITIES OF DAILY LIVING (ADL)
DOING_HOUSEWORK: INDEPENDENT
DRESSING: INDEPENDENT
TAKING_MEDICATION: INDEPENDENT
GROCERY_SHOPPING: INDEPENDENT
MANAGING_FINANCES: INDEPENDENT
BATHING: INDEPENDENT

## 2024-11-14 NOTE — ASSESSMENT & PLAN NOTE
Orders:    CBC; Future    Lipid Panel; Future    Comprehensive Metabolic Panel; Future    TSH with reflex to Free T4 if abnormal; Future    Vitamin D 25-Hydroxy,Total (for eval of Vitamin D levels); Future

## 2024-11-14 NOTE — PROGRESS NOTES
"Subjective   Reason for Visit: Hector Otoole Jr. is an 69 y.o. male here for a Medicare Wellness visit.          Reviewed all medications by prescribing practitioner or clinical pharmacist (such as prescriptions, OTCs, herbal therapies and supplements) and documented in the medical record.    HPI    Patient Care Team:  Christopher D'Amico, DO as PCP - General (Family Medicine)  Christopher D'Amico, DO as PCP - [employer] PCP     Review of Systems    Objective   Vitals:  /76   Pulse 67   Ht 1.702 m (5' 7\")   Wt 67.6 kg (149 lb)   SpO2 97%   BMI 23.34 kg/m²       Physical Exam    Assessment & Plan  Hypertension, unspecified type    Orders:    CBC; Future    Lipid Panel; Future    Comprehensive Metabolic Panel; Future    TSH with reflex to Free T4 if abnormal; Future    Hyperlipidemia, unspecified hyperlipidemia type    Orders:    CBC; Future    Lipid Panel; Future    Comprehensive Metabolic Panel; Future    TSH with reflex to Free T4 if abnormal; Future    Erectile dysfunction, unspecified erectile dysfunction type    Orders:    CBC; Future    Lipid Panel; Future    Comprehensive Metabolic Panel; Future    TSH with reflex to Free T4 if abnormal; Future    Chronic anemia    Orders:    CBC; Future    Lipid Panel; Future    Comprehensive Metabolic Panel; Future    TSH with reflex to Free T4 if abnormal; Future    Hypertensive heart disease without heart failure    Orders:    CBC; Future    Lipid Panel; Future    Comprehensive Metabolic Panel; Future    TSH with reflex to Free T4 if abnormal; Future    Medicare annual wellness visit, subsequent    Orders:    CBC; Future    Lipid Panel; Future    Comprehensive Metabolic Panel; Future    TSH with reflex to Free T4 if abnormal; Future    Wellness examination    Orders:    CBC; Future    Lipid Panel; Future    Comprehensive Metabolic Panel; Future    TSH with reflex to Free T4 if abnormal; Future    Vitamin D deficiency    Orders:    CBC; Future   "  Lipid Panel; Future    Comprehensive Metabolic Panel; Future    TSH with reflex to Free T4 if abnormal; Future    Vitamin D 25-Hydroxy,Total (for eval of Vitamin D levels); Future    Screening for prostate cancer    Orders:    CBC; Future    Lipid Panel; Future    Comprehensive Metabolic Panel; Future    TSH with reflex to Free T4 if abnormal; Future    Prostate Specific Antigen; Future    Nicotine use disorder    Orders:    CBC; Future    Lipid Panel; Future    Comprehensive Metabolic Panel; Future    TSH with reflex to Free T4 if abnormal; Future    CT lung screening low dose; Future    Nicotine dependence, cigarettes, in remission    Orders:    CT lung screening low dose; Future    Coronary artery disease involving native heart without angina pectoris, unspecified vessel or lesion type                     69-year-old male presenting for follow-up on multiple concerns, Medicare annual wellness exam/CPE.  Doing relatively well without any new concerns or interval changes.    HTN, HLD, hypertensive heart disease, LVH  Previously following with cardiology, lost to follow-up.  Tolerating regimen well.    Chronic anemia  Stable, asymptomatic    Erectile dysfunction  Not medically treated, reports not having issues.    SocHx:   - Smoking: Approximately 30-pack-year history, still smoking, only several cigarettes a week    12 point ROS reviewed and negative other than as stated in HPI      General: Alert, oriented, pleasant, in no acute distress  HEENT:      Head: normocephalic, atraumatic;      eyes: EOMI, no scleral icterus;   Neck: soft, supple, non-tender, no masses appreciated  CV: Heart with regular rate and rhythm, normal S1/S2, no murmurs  Lungs: CTAB without wheezing, rhonchi or rales; good respiratory effort, no increased work of breathing  Abdomen: soft, non-tender, non-distended, no masses appreciated  Extremities: no edema, no cyanosis  Neuro: Cranial nerves grossly intact; alert and oriented, normal  gait  Psych: Appropriate mood and affect    #HM  -CBC, CMP, Lipid panel, Vit D, TSH with reflex T4, PSA  -Vaccines:       Flu: declines      Shingrix: Recommended, advised to go to local pharmacy      Pneumococcal: Prevnar 20 given in office      Tdap:   -Lung cancer screening with low-dose CT: approximately 30 pack years, quit 2 years ago, screening ordered  -Colonoscopy: , 5 year plan  -AAA screenin, fusiform aneurysm found, sent to vascular    #HTN #Hypertensive heart disease #LVH  -BP at goal in office  -Continue diltiazem 240 mg daily    #HLD #CAD  -Continue atorvastatin 40 mg daily  -Recommend aspirin 81 mg daily, CT lung cancer screening showed significant coronary artery calcifications  - Repeat lipid panel  -Recommend returning to cardiology for follow-up    #Chronic anemia  -Repeat CBC    #Erectile dysfunction  -Not medicated, currently stable and content    #Ascending aorta aneurysm #fusiform abdominal aneurysm  -Attention on upcoming CT lung cancer screening  -Follow with vascular annually for surveillance    F/U 6 months, sooner if indicated    Chris D'Amico, DO

## 2024-11-14 NOTE — ASSESSMENT & PLAN NOTE
Orders:    CBC; Future    Lipid Panel; Future    Comprehensive Metabolic Panel; Future    TSH with reflex to Free T4 if abnormal; Future

## 2024-12-16 ENCOUNTER — HOSPITAL ENCOUNTER (OUTPATIENT)
Dept: RADIOLOGY | Facility: HOSPITAL | Age: 69
Discharge: HOME | End: 2024-12-16
Payer: MEDICARE

## 2024-12-16 DIAGNOSIS — F17.200 NICOTINE USE DISORDER: ICD-10-CM

## 2024-12-16 DIAGNOSIS — F17.211 NICOTINE DEPENDENCE, CIGARETTES, IN REMISSION: ICD-10-CM

## 2024-12-16 PROCEDURE — 71271 CT THORAX LUNG CANCER SCR C-: CPT | Performed by: RADIOLOGY

## 2024-12-16 PROCEDURE — 71271 CT THORAX LUNG CANCER SCR C-: CPT

## 2024-12-18 DIAGNOSIS — I25.10 CORONARY ARTERY DISEASE INVOLVING NATIVE HEART WITHOUT ANGINA PECTORIS, UNSPECIFIED VESSEL OR LESION TYPE: Primary | ICD-10-CM

## 2024-12-18 DIAGNOSIS — I11.9 HYPERTENSIVE HEART DISEASE WITHOUT HEART FAILURE: ICD-10-CM

## 2024-12-18 NOTE — RESULT ENCOUNTER NOTE
Lung cancer screening without any suspicious nodules, continue with annual screening.    Mild emphysema    Coronary artery calcium score estimated at 1005.  Continue atorvastatin, continue to recommend aspirin 81 mg daily.  I continue to recommend he returns to cardiology for follow-up.  I will put in a referral so he can establish with a cardiologist [place]-door to us.    Borderline dilated ascending thoracic aorta at 4 cm, unchanged from previous imaging.  Continue to follow with vascular, who is already watching his small AAA.

## 2024-12-19 ENCOUNTER — TELEPHONE (OUTPATIENT)
Dept: PRIMARY CARE | Facility: CLINIC | Age: 69
End: 2024-12-19
Payer: MEDICARE

## 2024-12-19 NOTE — TELEPHONE ENCOUNTER
----- Message from Christopher D'Amico sent at 12/18/2024  8:39 AM EST -----  Lung cancer screening without any suspicious nodules, continue with annual screening.    Mild emphysema    Coronary artery calcium score estimated at 1005.  Continue atorvastatin, continue to recommend aspirin 81 mg daily.  I continue to recommend he returns to cardiology for follow-up.  I will put in a referral so he can establish with a cardiologist next-door to us.    Borderline dilated ascending thoracic aorta at 4 cm, unchanged from previous imaging.  Continue to follow with vascular, who is already watching his small AAA.

## 2024-12-19 NOTE — TELEPHONE ENCOUNTER
Result Communication    Resulted Orders   CT lung screening low dose    Narrative    Interpreted By:  Siddhartha Moya,   STUDY:  CT LUNG SCREENING LOW DOSE; 12/16/2024 2:09 pm      INDICATION:  Signs/Symptoms:Screen.      COMPARISON:  CT dated 12/14/2023      ACCESSION NUMBER(S):  [id number]      ORDERING CLINICIAN:  CHRISTOPHER D'AMICO      TECHNIQUE:  Helical data acquisition of the chest was obtained without IV  contrast material.  Images were reformatted in axial, coronal, and  sagittal planes.      FINDINGS:  LUNGS AND AIRWAYS:  The trachea and central airways are patent. No endobronchial lesion  is seen.      There is mild bilateral upper lung predominant centrilobular and  paraseptal emphysema.There is no focal consolidation, pleural  effusion, or pneumothorax.      No suspicious pulmonary nodule      MEDIASTINUM AND MIGUEL, LOWER NECK AND AXILLA:  The visualized thyroid gland is within normal limits.  No evidence of thoracic lymphadenopathy by CT criteria.  Esophagus appears within normal limits as seen.      HEART AND VESSELS:  Borderline dilated ascending thoracic aorta measuring 4.0 cm.There is  mild scattered calcified atherosclerosis present. Main pulmonary  artery and its branches are normal in caliber. Estimated coronary  artery calcium score is 1005. The cardiac chambers are not enlarged.  There is a small pericardial effusion present.      UPPER ABDOMEN:  Mm nonobstructing stone in the right kidney. Presumed cyst in the  kidney, partially imaged.              CHEST WALL AND OSSEOUS STRUCTURES:  Chest wall is within normal limits.  No acute osseous pathology.There are no suspicious osseous lesions.      Multilevel degenerative changes throughout the thoracic spine.        Impression    1. No suspicious pulmonary nodules identified. Continued screening  with low-dose noncontrast chest CT in 12 months (from current date)  is recommended.  2. Mild upper lung predominant emphysema.  3. Estimated  coronary artery calcium score is 1005* which correlates  with at least 85th percentile rank as compared to matched ANDREWS-study  subjects([email or web address]/input.[email or web address]).  4. Borderline dilated ascending thoracic aorta measuring 4.0 cm,  unchanged.      LUNG RADS CATEGORY:  Lung Rad: Lung-RADS 1 (Negative)      Recommendation: Continue annual screening with Low Dose Chest CT in  12 months, recommended as per American College of Radiology  Guidelines Lung-RADS Version 2022.              **The patient's CAC score was measured with an FDA-cleared AI tool  that correlates well with traditional methods. However, due to the  non-gated CT scan and new algorithm, AI-powered scores should not  replace traditional cardiovascular risk assessment. For further  assistance, refer to the [facility] Cardiovascular Prevention  Program via an [facility] referral to 'Cardiology Prevention Program.'      MACRO:  None      Signed by: Siddhartha Sosa 12/16/2024 10:34 PM  Dictation workstation:   [id number]       3:22 PM      Results were not successfully communicated with the patient and they did not acknowledge their understanding.

## 2025-01-08 ENCOUNTER — APPOINTMENT (OUTPATIENT)
Dept: CARDIOLOGY | Facility: CLINIC | Age: 70
End: 2025-01-08
Payer: MEDICARE

## 2025-01-08 VITALS
BODY MASS INDEX: 23.54 KG/M2 | DIASTOLIC BLOOD PRESSURE: 86 MMHG | OXYGEN SATURATION: 97 % | HEIGHT: 67 IN | SYSTOLIC BLOOD PRESSURE: 134 MMHG | WEIGHT: 150 LBS | HEART RATE: 62 BPM

## 2025-01-08 DIAGNOSIS — I11.9 HYPERTENSIVE HEART DISEASE WITHOUT HEART FAILURE: ICD-10-CM

## 2025-01-08 DIAGNOSIS — R94.30 WALL MOTION ABNORMALITY OF INFERIOR WALL OF LEFT VENTRICLE: ICD-10-CM

## 2025-01-08 DIAGNOSIS — R93.1 ELEVATED CORONARY ARTERY CALCIUM SCORE: Primary | ICD-10-CM

## 2025-01-08 DIAGNOSIS — I25.10 CORONARY ARTERY DISEASE INVOLVING NATIVE HEART WITHOUT ANGINA PECTORIS, UNSPECIFIED VESSEL OR LESION TYPE: ICD-10-CM

## 2025-01-08 PROCEDURE — 1160F RVW MEDS BY RX/DR IN RCRD: CPT

## 2025-01-08 PROCEDURE — 3008F BODY MASS INDEX DOCD: CPT

## 2025-01-08 PROCEDURE — 99406 BEHAV CHNG SMOKING 3-10 MIN: CPT

## 2025-01-08 PROCEDURE — 3079F DIAST BP 80-89 MM HG: CPT

## 2025-01-08 PROCEDURE — 93000 ELECTROCARDIOGRAM COMPLETE: CPT

## 2025-01-08 PROCEDURE — 99204 OFFICE O/P NEW MOD 45 MIN: CPT

## 2025-01-08 PROCEDURE — 3075F SYST BP GE 130 - 139MM HG: CPT

## 2025-01-08 PROCEDURE — 1159F MED LIST DOCD IN RCRD: CPT

## 2025-01-08 NOTE — PATIENT INSTRUCTIONS
-Stress test  -Aspirin 81mg daily  -No change in other medications  -Lifestyle modification: Smoking cessation, weight management, routine physical activity.  -Encourage adherence to current medication regimen and monitor for adverse effects.  -Patient education provided on the importance of smoking cessation, cardiovascular risk management, and adherence to follow-up appointments.  -Cigarette smoking remains a leading contributor to cardiovascular morbidity and mortality worldwide. In [place] adults, smoking accounts for approximately 20 percent of cardiovascular deaths and almost one-third of deaths from coronary heart disease (CHD). Smoking is associated with an increased risk of death from heart disease across all age groups. The cardiovascular benefits of stopping smoking accrue almost immediately after quitting and increase over time. After one year of not smoking, the excess risk for CHD falls to 50 percent of that of current smokers. Even quitting after age 70 can reduce all-cause death  -In patients without known coronary heart disease (CHD), the reduction in cardiac event rate associated with smoking cessation ranges from 7 to 47 percent.  -Is important to know that quitting smoking is one of the most important things you can do to protect your health now and in the future.   -Free telephone quitline> 7-453-QUIT-NOW or 1-936.860.2033 can be used    - Patient will follow up with me in the Cardiology office once the results are available    -Monitor your blood pressure at home and keep a log of the readings    Steps to check your blood pressure at home    Be still> Don't smoke, drink caffeinated beverages or exercise within 30 minutes before measuring your blood pressure. Empty your bladder and ensure at least five minutes of quiet rest before measurements.   Sit correctly. Sit with your back straight and supported (on a dining chair, rather than a sofa). Your feet should be flat on the floor and your  legs should not be crossed. Your arm should be supported on a flat surface, such as a table, with the upper arm at heart level. Make sure the bottom of the cuff is placed directly above the bend of the elbow. Check your monitor's instructions for an illustration or have your health care professional show you how.  Measure at the same time every day. It’s important to take the readings at the same time each day, such as morning and evening. It is best to take the readings daily, ideally beginning two weeks after a change in treatment and during the week before your next appointment.  Take multiple readings and record the results. Each time you measure, take two readings one minute apart and record the results using a printable (PDF) tracker. If your monitor has built-in memory to store your readings, take it with you to your appointments. Some monitors may also allow you to upload your readings to a secure website after you register your profile.  Don't take the measurement over clothes    You can review the information here  [email or web address]_[email or web address]_Measure_Your_Blood_Pressure_Letter_[email or web address]

## 2025-01-08 NOTE — PROGRESS NOTES
Location of visit: [address]   Type of Visit: New    Chief Complaint:  Patient was referred to Cardiology by Dr. D'Amico for elevated calcium score    History Of Present Illness:    Hector Otoole Jr. is a 69 y.o. male, with history significant for hypertension, hypertensive heart disease with LVH, and hyperlipidemia, who visits Cardiology today as a new patient  for elevated calcium score.    He is presenting for cardiology follow-up with a history of hypertension, hypertensive heart disease with LVH, and hyperlipidemia. Blood pressure is well-controlled on diltiazem 240 mg daily, and lipid management includes atorvastatin 40 mg daily. Has significant coronary artery calcifications noted on a CT lung screening. The patient has a history of chronic anemia, currently stable and asymptomatic, and a 30-pack-year smoking history, still smoking minimally (now quitting). He also has a history of ascending aortic aneurysm and fusiform abdominal aortic aneurysm, both under vascular surveillance.     -Ct 11/14/24>  Estimated coronary artery calcium score is 1005* which correlates with at least 85th percentile rank as compared to matched HEATON-study subjects(https://www.heaton-nhlbi.org/Calcium/input.aspx). Borderline dilated ascending thoracic aorta measuring 4.0 cm, unchanged.  -Cardiac US 2023> 1. Left ventricular systolic function is normal with a 55-60% estimated ejection fraction.  Spectral Doppler shows an impaired relaxation pattern of left ventricular diastolic filling.There is moderate to severe hypokinesis of the basal inferior wall. Trace to mild mitral valve regurgitation.  -Recent labs show Chol 127 - LDL 70 - HDL 47 - Trig 49 - VLDL 10- NonHDL 80  / AST 15 - ALT 12 / Crea 1.12     Not known cardiac disease in his family history  He states is in normal functional capacity  He denies chest pain, dyspnea on exertion, shortness of breath, orthopnea, PND, nocturia, edema, palpitations, dizziness,  "lightheadedness, syncope, claudication, or snoring/apnea.    Blood pressure today: 134/86 mmHg    Today's ECG shows sinus rhythm at 59 bpm, normal AV conduction, normal QRS, asymmetric inverted t-waves in the inferolateral wall. PVC    Past Medical History:  He has a past medical history of Essential (primary) hypertension (06/22/2022).    Past Surgical History:  He has a past surgical history that includes Knee arthroscopy w/ debridement (12/26/2013).    Social History:  He reports that he has been smoking cigarettes. He has never used smokeless tobacco. No history on file for alcohol use and drug use.    Family History:  No family history on file.  Allergies:  Lisinopril    Outpatient Medications:  Current Outpatient Medications   Medication Instructions    atorvastatin (LIPITOR) 40 mg, oral, Daily    dilTIAZem CD (CARDIZEM CD) 240 mg, oral, Daily     Last Recorded Vitals:  There were no vitals filed for this visit.    Physical Exam:      11/14/2024     9:13 AM 7/16/2024    12:37 PM 2/21/2024    11:30 AM 2/13/2024     1:30 PM 11/17/2023     9:09 AM 3/22/2023     1:10 PM   Vitals   Systolic 120 115 143 124 101 114   Diastolic 76 71 83 76 67 70   BP Location   Left arm      Heart Rate 67 63 87 82 62 66   Resp      16   Height 1.702 m (5' 7\") 1.702 m (5' 7\") 1.702 m (5' 7\") 1.727 m (5' 8\") 1.727 m (5' 8\") 1.727 m (5' 8\")   Weight (lb) 149 148 145.8 145 145 152   BMI 23.34 kg/m2 23.18 kg/m2 22.84 kg/m2 22.05 kg/m2 22.05 kg/m2 23.11 kg/m2   BSA (m2) 1.79 m2 1.78 m2 1.77 m2 1.78 m2 1.78 m2 1.82 m2   Visit Report Report Report Report Report Report      Wt Readings from Last 5 Encounters:   11/14/24 67.6 kg (149 lb)   07/16/24 67.1 kg (148 lb)   02/21/24 66.1 kg (145 lb 12.8 oz)   02/13/24 65.8 kg (145 lb)   11/17/23 65.8 kg (145 lb)       Physical Exam  Vitals reviewed.   HENT:      Head: Normocephalic.      Mouth/Throat:      Pharynx: Oropharynx is clear.   Eyes:      Pupils: Pupils are equal, round, and reactive to " "light.   Cardiovascular:      Rate and Rhythm: Normal rate.      Pulses: Normal pulses.      Heart sounds: Normal heart sounds.   Pulmonary:      Effort: Pulmonary effort is normal.      Breath sounds: Normal breath sounds.   Abdominal:      General: Abdomen is flat. Bowel sounds are normal.      Palpations: Abdomen is soft.   Musculoskeletal:         General: Normal range of motion.   Skin:     General: Skin is warm and dry.   Neurological:      General: No focal deficit present.      Mental Status: He is alert.   Psychiatric:         Mood and Affect: Mood normal.              Last Labs Reviewed:  CBC -  Recent Labs     07/16/24  1303 02/21/24  1416 04/26/23  1131 12/16/21  1130 12/21/20  1901   WBC 5.7 7.1 6.6 6.0 16.3*   HGB 13.9 14.0 13.8 13.5 11.9*   HCT 42.3 44.4 42.4 41.2 35.2*    255 231 240 291   MCV 91 94 91 92 88     CMP -  Recent Labs     07/16/24  1303 02/21/24  1416 04/26/23  1131 12/16/21  1130 12/21/20  1901    144 143 143 144   K 4.7 4.2 4.3 4.3 4.3    108* 106 108* 107   CO2 29 27 32 30 26   ANIONGAP 13 13 9* 9* 15   BUN 16 14 14 20 21   CREATININE 1.12 1.03 1.25 1.17 1.16   EGFR 71 79  --   --   --    CALCIUM 9.9 10.1 10.1 10.2 9.7     Recent Labs     07/16/24  1303 02/21/24  1416 04/26/23  1131 12/16/21  1130 11/18/20  1156   ALBUMIN 4.3 4.5 4.3 4.3 4.1   ALKPHOS 120 123 122 116 121   ALT 12 13 14 17 10   AST 15 14 15 18 12   BILITOT 0.5 0.7 0.6 0.5 0.4     LIPID PANEL -   Recent Labs     07/16/24  1303 02/21/24  1416 04/26/23  1131 12/16/21  1130 11/18/20  1156   CHOL 127 150 146 140 130   LDLF  --   --  77 81 76   LDLCALC 70 76  --   --   --    HDL 47.4 59.9 52.7 45.0 42.4   TRIG 49 71 83 68 57     COAGULATION PANEL -  No results for input(s): \"PTT\", \"INR\", \"HAUF\", \"DDIMERVTE\", \"HAPTOGLOBIN\", \"FIBRINOGEN\" in the last 49964 hours.  HEME/ENDO -  Recent Labs     02/21/24  1416   TSH 1.10     CARDIOVASCULAR  No results for input(s): \"LDH\", \"CKMB\", \"TROPHS\", \"BNP\", \"CRP\" in the " "last 32011 hours.    No lab exists for component: \"CK\", \"CKMBP\", \"CRPUS\", \"USCRP\"  Last Cardiology/Imaging Tests Personally Reviewed (if images available) and Interpreted:  ECG:  No results found for this or any previous visit (from the past 4464 hours).No results found for this or any previous visit from the past 1095 days.    Echocardiogram:  Echocardiogram     Narrative  [facility], [address] [place] [address]  Tel [phone] and Fax [phone]    TRANSTHORACIC ECHOCARDIOGRAM REPORT      Patient Name:     LAWRENCE ELIDA BRIAN    Reading Physician:  [id number] Alexey Birch MD, JR.  Study Date:       4/26/2023           Referring           ALEXEY BIRCH  Physician:  MRN/PID:          [id number]            PCP:  Accession/Order#: [id number]        Counts include 234 beds at the [facility]  Location:  YOB: 1955           Fellow:  Gender:           M                   Nurse:  Admit Date:                           Sonographer:        Giovani Mar [facility]  Admission Status: Outpatient          Additional Staff:  Height:           172.72 cm           CC Report to:  Weight:           68.95 kg            Study Type:         Echocardiogram  BSA:              1.82 m2  Blood Pressure: 119 /65 mmHg    Diagnosis/ICD: I11.9-Hypertensive heart disease without heart failure  Indication:    Hypertensive heart disease  Procedure/CPT: Echo Complete w Full [id number]    Patient History:  Pertinent History: HTN, HLD, smoker.    Study Detail: The following Echo studies were performed: 2D, M-Mode, Doppler and  color flow. Technically challenging study due to prominent lung  artifact.      PHYSICIAN INTERPRETATION:  Left Ventricle: The left ventricular systolic function is normal, with an estimated ejection fraction of 55-60%. The calculated ejection fraction is normal at 57 % using the Cintron's Bi-plane MOD calculation. Wall motion is abnormal. The left ventricular cavity size is normal. There is mild " to moderate concentric left ventricular hypertrophy. Spectral Doppler shows an impaired relaxation pattern of left ventricular diastolic filling. There is moderate to severe hypokinesis of the basal inferior wall.  Left Atrium: The left atrium is normal in size.  Right Ventricle: The right ventricle is normal in size. There is normal right ventriclar wall thickness. There is normal right ventricular global systolic function.  Right Atrium: The right atrium is normal in size.  Aortic Valve: The aortic valve appears structurally normal. The aortic valve appears tricuspid and non-restricted. There is no evidence of aortic valve regurgitation. The peak instantaneous gradient of the aortic valve is 4.4 mmHg. The mean gradient of the aortic valve is 2.6 mmHg.  Mitral Valve: The mitral valve is normal in structure. There is normal mitral valve leaflet mobility. There is trace to mild mitral valve regurgitation.  Tricuspid Valve: The tricuspid valve is structurally normal. There is normal tricuspid valve leaflet mobility. There is trace tricuspid regurgitation.  Pulmonic Valve: The pulmonic valve is structurally normal. There is trace to mild pulmonic valve regurgitation.  Pericardium: There is a trivial pericardial effusion.  Aorta: The aortic root is normal. The Ao Sinus is 3.30 cm. The Asc Ao is 3.40 cm. The thoracic aorta diam is 3.2 cm. There is no dilatation of the aortic arch. There is upper limits of normal dilatation of the ascending aorta. There is no dilatation of the aortic root.  Pulmonary Artery: The pulmonary artery is normal in size. The estimated pulmonary artery pressure is normal.  Systemic Veins: The inferior vena cava appears to be of normal size. There is IVC inspiratory collapse greater than 50%.      CONCLUSIONS:  1. Left ventricular systolic function is normal with a 55-60% estimated ejection fraction.  2. Spectral Doppler shows an impaired relaxation pattern of left ventricular diastolic  filling.  3. There is moderate to severe hypokinesis of the basal inferior wall.  4. Trace to mild mitral valve regurgitation.    QUANTITATIVE DATA SUMMARY:  2D MEASUREMENTS:  Normal Ranges:  LAs:           3.60 cm    (2.7-4.0cm)  IVSd:          1.16 cm    (0.6-1.1cm)  LVPWd:         1.17 cm    (0.6-1.1cm)  LVIDd:         4.84 cm    (3.9-5.9cm)  LVIDs:         3.31 cm  LV Mass Index: 117.6 g/m2  LV % FS        31.6 %    LA VOLUME:  Normal Ranges:  LA Vol A4C:        75.0 ml    (22+/-6mL/m2)  LA Vol A2C:        104.4 ml  LA Vol BP:         89.2 ml  LA Vol Index A4C:  41.2ml/m2  LA Vol Index A2C:  57.4 ml/m2  LA Vol Index BP:   49.1 ml/m2  LA Area A4C:       23.2 cm2  LA Area A2C:       27.6 cm2  LA Major Axis A4C: 6.1 cm  LA Major Axis A2C: 6.2 cm  LA Vol A4C:        69.5 ml  LA Vol A2C:        101.2 ml    RA VOLUME BY A/L METHOD:  Normal Ranges:  RA Vol A4C:        58.9 ml    (8.3-19.5ml)  RA Vol Index A4C:  32.4 ml/m2  RA Area A4C:       18.8 cm2  RA Major Axis A4C: 5.1 cm    AORTA MEASUREMENTS:  Normal Ranges:  Ao Sinus, d: 3.30 cm (2.1-3.5cm)  Ao STJ, d:   2.90 cm (1.7-3.4cm)  Asc Ao, d:   3.40 cm (2.1-3.4cm)  Ao Arch:     3.20 cm (2.0-3.6cm)    LV SYSTOLIC FUNCTION BY 2D PLANIMETRY (MOD):  Normal Ranges:  EF-A4C View: 60.2 % (>=55%)  EF-A2C View: 56.4 %  EF-Biplane:  56.7 %    LV DIASTOLIC FUNCTION:  Normal Ranges:  MV Peak E:        0.58 m/s   (0.7-1.2 m/s)  MV Peak A:        0.75 m/s   (0.42-0.7 m/s)  E/A Ratio:        0.77       (1.0-2.2)  MV lateral e'     0.07 m/s  MV medial e'      0.05 m/s  PulmV Sys Abdirashid:    45.63 cm/s  PulmV Phan Abdirashid:   36.14 cm/s  PulmV S/D Abdirashid:    1.26  PulmV A Revs Abdirashid: 34.17 cm/s    MITRAL VALVE:  Normal Ranges:  MV DT: 217 msec (150-240msec)    AORTIC VALVE:  Normal Ranges:  AoV Vmax:                1.05 m/s (<=1.7m/s)  AoV Peak P.4 mmHg (<20mmHg)  AoV Mean P.6 mmHg (1.7-11.5mmHg)  LVOT Max Abdirashid:            0.91 m/s (<=1.1m/s)  AoV VTI:                  23.08 cm (18-25cm)  LVOT VTI:                19.79 cm  LVOT Diameter:           2.22 cm  (1.8-2.4cm)  AoV Area, VTI:           3.31 cm2 (2.5-5.5cm2)  AoV Area,Vmax:           3.34 cm2 (2.5-4.5cm2)  AoV Dimensionless Index: 0.86    RIGHT VENTRICLE:  RV 1   3.7 cm  RV 2   3.0 cm  RV 3   7.3 cm  TAPSE: 20.3 mm  RV s'  0.10 m/s    TRICUSPID VALVE/RVSP:  Normal Ranges:  IVC Diam: 1.90 cm    PULMONIC VALVE:  Normal Ranges:  PV Accel Time: 117 msec (>120ms)  PV Max Abdirashid:    0.7 m/s  (0.6-0.9m/s)  PV Max P.2 mmHg    Pulmonary Veins:  PulmV A Revs Abdirashid: 34.17 cm/s  PulmV Phan Abdirashid:   36.14 cm/s  PulmV S/D Abdirashid:    1.26  PulmV Sys Abdirashid:    45.63 cm/s      [id number] Alexey Lang MD  Electronically signed on 2023 at 5:26:55 PM        No results found for this or any previous visit from the past 1095 days.    Cath:  No results found for this or any previous visit from the past 1825 days.  No results found for this or any previous visit from the past 3650 days.  No results found for this or any previous visit from the past 1095 days.    Stress Test:  No results found for this or any previous visit from the past 1825 days.  No results found for this or any previous visit from the past 1095 days.    Cardiac CT/MRI:  No results found for this or any previous visit from the past 1825 days.  No results found for this or any previous visit from the past 1095 days.    Other CT:      CV RISK FACTORS:   # Hypertension: Last BP:  .  # Hyperlipidemia: Last Tchol 127 / LDL No results found for requested labs within last 365 days. / HDL 47.4 / TRIG 49 (2024:  1:03 PM).  # Type II Diabetes Mellitus: Last A1c No results found for requested labs within last 365 days. (No results in last year.).  # Obesity: Last BMI:  .  # CKD: Last BUN/Cr (GFR): .12 (71), 2024:  1:03 PM.    ASCV RISK:  The ASCVD Risk score (Tom CALDERON, et al., 2019) failed to calculate for the following reasons:    The valid total cholesterol range is 130 to  320 mg/dL    Assessment/Plan   Hector Otoole Jr. is a 69 y.o. male, with a history of hypertension, hypertensive heart disease with left ventricular hypertrophy (LVH), and hyperlipidemia, presenting for evaluation due to an elevated coronary artery calcium (CAC) score. His medical history is also significant for chronic anemia (stable), a 30-pack-year smoking history (currently minimal smoking, attempting cessation), and ascending and abdominal aortic aneurysms under vascular surveillance. He denies symptoms of ischemic heart disease or heart failure, including chest pain, dyspnea, orthopnea, or syncope.  Key Findings:  -Imaging:  -CT (11/14/2024): CAC score 1005 (>=85th percentile compared to HEATON cohort), borderline dilated ascending thoracic aorta (4.0 cm, unchanged).  -Echocardiography (2023): Normal left ventricular systolic function (EF 55-60%), diastolic dysfunction with impaired relaxation, moderate to severe hypokinesis of the basal inferior wall, trace-to-mild mitral regurgitation.  Labs: Lipid profile optimized: LDL 70, Non-HDL 80. Creatinine 1.12, liver enzymes within normal limits.   -ECG (today): Sinus rhythm at 59 bpm, asymmetric inverted T-waves in the inferolateral wall, PVCs.    Initial Diagnoses/Differential Diagnoses:  #Coronary artery disease (CAD): Significant coronary calcifications, inferolateral T-wave inversion, and basal inferior wall hypokinesis are concerning for underlying CAD.  #Hypertensive heart disease: History of well-controlled hypertension with associated LVH.  #Diastolic dysfunction: Impaired LV relaxation, likely secondary to hypertensive heart disease.  #Aneurysmal disease: Stable ascending and abdominal aortic aneurysms under surveillance.  #Chronic anemia: Currently asymptomatic but relevant for cardiac workload and oxygen delivery.    Assessment and Plan:    Coronary Artery Disease (CAD):  -Recommend a stress imaging study (nuclear stress test) to assess for ischemia,  particularly given the high CAC score and regional wall motion abnormality.  -Discuss initiation of aspirin 81 mg daily, considering elevated CAC and potential atherosclerotic burden.  -Emphasize aggressive risk factor modification, including smoking cessation (support with counseling or pharmacotherapy as needed).  Hypertensive Heart Disease and LVH:  -Continue diltiazem 240 mg daily for blood pressure control. Target BP <130/80 mmHg. Monitor for symptoms of diastolic dysfunction.  Hyperlipidemia:  -Continue atorvastatin 40 mg daily. Goal LDL remains <70 mg/dL.  Aneurysmal Disease:  -Continue vascular surveillance for thoracic and abdominal aortic aneurysms. Reassess imaging intervals with vascular surgery team.  Chronic Anemia:  -Periodic monitoring of hemoglobin and iron studies. Evaluate for progression or symptoms if clinically indicated.    Smoking education  -Cigarette smoking remains a leading contributor to cardiovascular morbidity and mortality worldwide. In [place] adults, smoking accounts for approximately 20 percent of cardiovascular deaths and almost one-third of deaths from coronary heart disease (CHD). Smoking is associated with an increased risk of death from heart disease across all age groups. The cardiovascular benefits of stopping smoking accrue almost immediately after quitting and increase over time. After one year of not smoking, the excess risk for CHD falls to 50 percent of that of current smokers. Even quitting after age 70 can reduce all-cause death  -In patients without known coronary heart disease (CHD), the reduction in cardiac event rate associated with smoking cessation ranges from 7 to 47 percent.  -Is important to know that quitting smoking is one of the most important things you can do to protect your health now and in the future.   -Free telephone quitline> 7-398-QUIT-NOW or 1-877.811.4565 can be used    Recommendations:  -Stress test  -Aspirin 81mg daily  -No change in  other medications  -Check BP at home  -Lifestyle modification: Smoking cessation, weight management, routine physical activity.  -Encourage adherence to current medication regimen and monitor for adverse effects.  -Patient education provided on the importance of smoking cessation, cardiovascular risk management, and adherence to follow-up appointments.    - Patient will follow up with me in the Cardiology office once the results are available  - I spent 45 minutes assessing the case between pre-charting, face-to-face patient interaction, and documentation    Shawn Castillo MD

## 2025-01-09 ENCOUNTER — TELEPHONE (OUTPATIENT)
Dept: PRIMARY CARE | Facility: CLINIC | Age: 70
End: 2025-01-09
Payer: MEDICARE

## 2025-01-09 NOTE — TELEPHONE ENCOUNTER
Result Communication    Resulted Orders   CT lung screening low dose    Narrative    Interpreted By:  Siddhartha Moya,   STUDY:  CT LUNG SCREENING LOW DOSE; 12/16/2024 2:09 pm      INDICATION:  Signs/Symptoms:Screen.      COMPARISON:  CT dated 12/14/2023      ACCESSION NUMBER(S):  [id number]      ORDERING CLINICIAN:  CHRISTOPHER D'AMICO      TECHNIQUE:  Helical data acquisition of the chest was obtained without IV  contrast material.  Images were reformatted in axial, coronal, and  sagittal planes.      FINDINGS:  LUNGS AND AIRWAYS:  The trachea and central airways are patent. No endobronchial lesion  is seen.      There is mild bilateral upper lung predominant centrilobular and  paraseptal emphysema.There is no focal consolidation, pleural  effusion, or pneumothorax.      No suspicious pulmonary nodule      MEDIASTINUM AND MIGUEL, LOWER NECK AND AXILLA:  The visualized thyroid gland is within normal limits.  No evidence of thoracic lymphadenopathy by CT criteria.  Esophagus appears within normal limits as seen.      HEART AND VESSELS:  Borderline dilated ascending thoracic aorta measuring 4.0 cm.There is  mild scattered calcified atherosclerosis present. Main pulmonary  artery and its branches are normal in caliber. Estimated coronary  artery calcium score is 1005. The cardiac chambers are not enlarged.  There is a small pericardial effusion present.      UPPER ABDOMEN:  Mm nonobstructing stone in the right kidney. Presumed cyst in the  kidney, partially imaged.              CHEST WALL AND OSSEOUS STRUCTURES:  Chest wall is within normal limits.  No acute osseous pathology.There are no suspicious osseous lesions.      Multilevel degenerative changes throughout the thoracic spine.        Impression    1. No suspicious pulmonary nodules identified. Continued screening  with low-dose noncontrast chest CT in 12 months (from current date)  is recommended.  2. Mild upper lung predominant emphysema.  3. Estimated  coronary artery calcium score is 1005* which correlates  with at least 85th percentile rank as compared to matched ANDREWS-study  subjects(https://[email or web address]-nhlbi.org/Calcium/input.aspx).  4. Borderline dilated ascending thoracic aorta measuring 4.0 cm,  unchanged.      LUNG RADS CATEGORY:  Lung Rad: Lung-RADS 1 (Negative)      Recommendation: Continue annual screening with Low Dose Chest CT in  12 months, recommended as per American College of Radiology  Guidelines Lung-RADS Version 2022.              **The patient's CAC score was measured with an FDA-cleared AI tool  that correlates well with traditional methods. However, due to the  non-gated CT scan and new algorithm, AI-powered scores should not  replace traditional cardiovascular risk assessment. For further  assistance, refer to the [facility] Cardiovascular Prevention  Program via an [facility] referral to 'Cardiology Prevention Program.'      MACRO:  None      Signed by: Siddhartha Sosa 12/16/2024 10:34 PM  Dictation workstation:   [id number]       11:42 AM      Results were not successfully communicated with the patient and they did not acknowledge their understanding.  [employer] service stated he was unavailable

## 2025-01-23 DIAGNOSIS — E78.5 HYPERLIPIDEMIA, UNSPECIFIED: ICD-10-CM

## 2025-01-23 DIAGNOSIS — I10 ESSENTIAL (PRIMARY) HYPERTENSION: ICD-10-CM

## 2025-01-23 RX ORDER — ATORVASTATIN CALCIUM 40 MG/1
40 TABLET, FILM COATED ORAL DAILY
Qty: 90 TABLET | Refills: 3 | Status: SHIPPED | OUTPATIENT
Start: 2025-01-23

## 2025-01-23 RX ORDER — DILTIAZEM HYDROCHLORIDE 240 MG/1
240 CAPSULE, COATED, EXTENDED RELEASE ORAL DAILY
Qty: 90 CAPSULE | Refills: 3 | Status: SHIPPED | OUTPATIENT
Start: 2025-01-23

## 2025-01-28 ENCOUNTER — APPOINTMENT (OUTPATIENT)
Dept: RADIOLOGY | Facility: HOSPITAL | Age: 70
End: 2025-01-28
Payer: MEDICARE

## 2025-01-28 ENCOUNTER — APPOINTMENT (OUTPATIENT)
Dept: CARDIOLOGY | Facility: HOSPITAL | Age: 70
End: 2025-01-28
Payer: MEDICARE

## 2025-01-31 ENCOUNTER — HOSPITAL ENCOUNTER (OUTPATIENT)
Dept: RADIOLOGY | Facility: HOSPITAL | Age: 70
Discharge: HOME | End: 2025-01-31
Payer: MEDICARE

## 2025-01-31 ENCOUNTER — HOSPITAL ENCOUNTER (OUTPATIENT)
Dept: CARDIOLOGY | Facility: HOSPITAL | Age: 70
Discharge: HOME | End: 2025-01-31
Payer: MEDICARE

## 2025-01-31 DIAGNOSIS — I11.9 HYPERTENSIVE HEART DISEASE WITHOUT HEART FAILURE: Primary | ICD-10-CM

## 2025-01-31 DIAGNOSIS — R93.1 ELEVATED CORONARY ARTERY CALCIUM SCORE: ICD-10-CM

## 2025-01-31 DIAGNOSIS — I11.9 HYPERTENSIVE HEART DISEASE WITHOUT HEART FAILURE: ICD-10-CM

## 2025-01-31 DIAGNOSIS — R94.30 WALL MOTION ABNORMALITY OF INFERIOR WALL OF LEFT VENTRICLE: ICD-10-CM

## 2025-01-31 DIAGNOSIS — I25.10 CORONARY ARTERY DISEASE INVOLVING NATIVE HEART WITHOUT ANGINA PECTORIS, UNSPECIFIED VESSEL OR LESION TYPE: ICD-10-CM

## 2025-01-31 PROCEDURE — 93018 CV STRESS TEST I&R ONLY: CPT | Performed by: INTERNAL MEDICINE

## 2025-01-31 PROCEDURE — 3430000001 HC RX 343 DIAGNOSTIC RADIOPHARMACEUTICALS

## 2025-01-31 PROCEDURE — 93016 CV STRESS TEST SUPVJ ONLY: CPT | Performed by: INTERNAL MEDICINE

## 2025-01-31 PROCEDURE — 78452 HT MUSCLE IMAGE SPECT MULT: CPT

## 2025-01-31 PROCEDURE — A9502 TC99M TETROFOSMIN: HCPCS

## 2025-01-31 PROCEDURE — 78452 HT MUSCLE IMAGE SPECT MULT: CPT | Performed by: STUDENT IN AN ORGANIZED HEALTH CARE EDUCATION/TRAINING PROGRAM

## 2025-01-31 PROCEDURE — 93017 CV STRESS TEST TRACING ONLY: CPT

## 2025-01-31 RX ORDER — REGADENOSON 0.08 MG/ML
0.4 INJECTION, SOLUTION INTRAVENOUS ONCE
Status: SHIPPED | OUTPATIENT
Start: 2025-01-31

## 2025-01-31 RX ADMIN — TETROFOSMIN 36 MILLICURIE: 0.23 INJECTION, POWDER, LYOPHILIZED, FOR SOLUTION INTRAVENOUS at 10:57

## 2025-01-31 RX ADMIN — TETROFOSMIN 12 MILLICURIE: 0.23 INJECTION, POWDER, LYOPHILIZED, FOR SOLUTION INTRAVENOUS at 09:07

## 2025-02-19 ENCOUNTER — APPOINTMENT (OUTPATIENT)
Dept: VASCULAR SURGERY | Facility: HOSPITAL | Age: 70
End: 2025-02-19
Payer: MEDICARE

## 2025-02-19 ENCOUNTER — APPOINTMENT (OUTPATIENT)
Dept: RADIOLOGY | Facility: HOSPITAL | Age: 70
End: 2025-02-19
Payer: MEDICARE

## 2025-02-20 ENCOUNTER — HOSPITAL ENCOUNTER (OUTPATIENT)
Dept: VASCULAR MEDICINE | Facility: HOSPITAL | Age: 70
Discharge: HOME | End: 2025-02-20
Payer: MEDICARE

## 2025-02-20 DIAGNOSIS — I71.43 INFRARENAL ABDOMINAL AORTIC ANEURYSM (AAA) WITHOUT RUPTURE (CMS-HCC): ICD-10-CM

## 2025-02-20 DIAGNOSIS — I71.40 ABDOMINAL AORTIC ANEURYSM, WITHOUT RUPTURE, UNSPECIFIED (CMS-HCC): ICD-10-CM

## 2025-02-20 PROCEDURE — 93978 VASCULAR STUDY: CPT | Performed by: INTERNAL MEDICINE

## 2025-02-20 PROCEDURE — 93978 VASCULAR STUDY: CPT

## 2025-02-21 ENCOUNTER — APPOINTMENT (OUTPATIENT)
Dept: VASCULAR SURGERY | Facility: CLINIC | Age: 70
End: 2025-02-21
Payer: MEDICARE

## 2025-02-21 DIAGNOSIS — I71.43 INFRARENAL ABDOMINAL AORTIC ANEURYSM (AAA) WITHOUT RUPTURE (CMS-HCC): Primary | ICD-10-CM

## 2025-02-21 PROCEDURE — 99213 OFFICE O/P EST LOW 20 MIN: CPT | Performed by: SURGERY

## 2025-02-21 PROCEDURE — 1159F MED LIST DOCD IN RCRD: CPT | Performed by: SURGERY

## 2025-02-21 NOTE — PROGRESS NOTES
Vascular Surgery Clinic Note    CC: aaa    HPI:  Hector Otoole Jr. is a 70 y.o. male with history of aaa. Recent duplex show aneurysm about 3.6cm. He denies any sudden back or abdominal pain. Has history of herniated disk, so has constant back pain. He is retired. Currently still a smoker, but has decreased down to 2 cig/day.      Medical History:   has a past medical history of Essential (primary) hypertension (06/22/2022).    Meds:   Current Outpatient Medications on File Prior to Visit   Medication Sig Dispense Refill    atorvastatin (Lipitor) 40 mg tablet TAKE 1 TABLET BY MOUTH EVERY DAY 90 tablet 3    dilTIAZem CD (Cardizem CD) 240 mg 24 hr capsule TAKE 1 CAPSULE BY MOUTH EVERY DAY 90 capsule 3     Current Facility-Administered Medications on File Prior to Visit   Medication Dose Route Frequency Provider Last Rate Last Admin    regadenoson (Lexiscan) injection 0.4 mg  0.4 mg intravenous Once Shawn Castillo MD            Allergies:   Allergies   Allergen Reactions    Lisinopril Swelling       SH:    Social Drivers of Health     Tobacco Use: High Risk (1/8/2025)    Patient History     Smoking Tobacco Use: Some Days     Smokeless Tobacco Use: Never     Passive Exposure: Not on file   Alcohol Use: Not on file   Financial Resource Strain: Not on file   Food Insecurity: Not on file   Transportation Needs: Not on file   Physical Activity: Not on file   Stress: Not on file   Social Connections: Not on file   Intimate Partner Violence: Not on file   Depression: Not at risk (11/14/2024)    PHQ-2     PHQ-2 Score: 0   Housing Stability: Not on file   Utilities: Not on file   Digital Equity: Not on file   Health Literacy: Not on file        FH:  No family history on file.     ROS:  All systems were reviewed and are negative except as per HPI.    Objective:  Vitals:  There were no vitals filed for this visit.       Assessment & Plan:  Hector Otoole Jr. is 70 y.o. male with small AAA measuring around 3.6cm. RTC  yearly for surveillance     oDrota Anderson PA-C

## 2025-04-09 ENCOUNTER — APPOINTMENT (OUTPATIENT)
Dept: CARDIOLOGY | Facility: CLINIC | Age: 70
End: 2025-04-09
Payer: MEDICARE

## 2025-04-16 ENCOUNTER — APPOINTMENT (OUTPATIENT)
Dept: CARDIOLOGY | Facility: CLINIC | Age: 70
End: 2025-04-16
Payer: MEDICARE

## 2025-04-16 VITALS
OXYGEN SATURATION: 98 % | BODY MASS INDEX: 23.96 KG/M2 | HEART RATE: 60 BPM | DIASTOLIC BLOOD PRESSURE: 96 MMHG | SYSTOLIC BLOOD PRESSURE: 167 MMHG | WEIGHT: 153 LBS

## 2025-04-16 DIAGNOSIS — I50.22 HEART FAILURE WITH MID-RANGE EJECTION FRACTION (HFMEF): ICD-10-CM

## 2025-04-16 DIAGNOSIS — R93.1 ELEVATED CORONARY ARTERY CALCIUM SCORE: ICD-10-CM

## 2025-04-16 DIAGNOSIS — I25.2 OLD MI (MYOCARDIAL INFARCTION): ICD-10-CM

## 2025-04-16 DIAGNOSIS — I25.10 CORONARY ARTERY DISEASE INVOLVING NATIVE HEART WITHOUT ANGINA PECTORIS, UNSPECIFIED VESSEL OR LESION TYPE: Primary | ICD-10-CM

## 2025-04-16 DIAGNOSIS — I10 PRIMARY HYPERTENSION: ICD-10-CM

## 2025-04-16 DIAGNOSIS — R94.30 WALL MOTION ABNORMALITY OF INFERIOR WALL OF LEFT VENTRICLE: ICD-10-CM

## 2025-04-16 PROCEDURE — 99406 BEHAV CHNG SMOKING 3-10 MIN: CPT

## 2025-04-16 PROCEDURE — 99214 OFFICE O/P EST MOD 30 MIN: CPT

## 2025-04-16 PROCEDURE — 3080F DIAST BP >= 90 MM HG: CPT

## 2025-04-16 PROCEDURE — 1160F RVW MEDS BY RX/DR IN RCRD: CPT

## 2025-04-16 PROCEDURE — 3077F SYST BP >= 140 MM HG: CPT

## 2025-04-16 PROCEDURE — G2211 COMPLEX E/M VISIT ADD ON: HCPCS

## 2025-04-16 PROCEDURE — 1159F MED LIST DOCD IN RCRD: CPT

## 2025-04-16 RX ORDER — METOPROLOL SUCCINATE 50 MG/1
50 TABLET, EXTENDED RELEASE ORAL DAILY
Qty: 90 TABLET | Refills: 3 | Status: SHIPPED | OUTPATIENT
Start: 2025-04-16 | End: 2026-04-16

## 2025-04-16 RX ORDER — VALSARTAN 160 MG/1
160 TABLET ORAL DAILY
Qty: 90 TABLET | Refills: 3 | Status: SHIPPED | OUTPATIENT
Start: 2025-04-16 | End: 2026-04-16

## 2025-04-16 RX ORDER — ASPIRIN 81 MG/1
81 TABLET ORAL DAILY
Qty: 30 TABLET | Refills: 11 | Status: SHIPPED | OUTPATIENT
Start: 2025-04-16 | End: 2026-04-16

## 2025-04-16 NOTE — PROGRESS NOTES
Location of visit: [address]   Type of Visit: Established - Last Seen: 1/8/2025     Chief Complaint:  Patient was referred to Cardiology by Dr. Christine bowers. provider found for elevated calcium score    History Of Present Illness:    Hector Otoole Jr. is a 70 y.o. male, with history significant for hypertension, hypertensive heart disease with LVH, and hyperlipidemia, who visits Cardiology today as a follow-up visit  for elevated calcium score.    He is presenting for cardiology follow-up with a history of hypertension, hypertensive heart disease with LVH, and hyperlipidemia. Blood pressure is well-controlled on diltiazem 240 mg daily, and lipid management includes atorvastatin 40 mg daily. Has significant coronary artery calcifications noted on a CT lung screening. The patient has a history of chronic anemia, currently stable and asymptomatic, and a 30-pack-year smoking history, still smoking minimally (now quitting). He also has a history of ascending aortic aneurysm and fusiform abdominal aortic aneurysm, both under vascular surveillance.   -Ct 11/14/24>  Estimated coronary artery calcium score is 1005* which correlates with at least 85th percentile rank as compared to matched ANDREWS-study subjects([email or web address]://[email or web address]-[email or web address]/input.[email or web address]). Borderline dilated ascending thoracic aorta measuring 4.0 cm, unchanged.  -Cardiac US 2023> 1. Left ventricular systolic function is normal with a 55-60% estimated ejection fraction.  Spectral Doppler shows an impaired relaxation pattern of left ventricular diastolic filling.There is moderate to severe hypokinesis of the basal inferior wall. Trace to mild mitral valve regurgitation.  -Recent labs show Chol 127 - LDL 70 - HDL 47 - Trig 49 - VLDL 10- NonHDL 80  / AST 15 - ALT 12 / Crea 1.12     At prior visit> Not known cardiac disease in his family history. He states is in normal functional capacity. He denies chest pain, dyspnea on exertion, shortness of breath,  orthopnea, PND, nocturia, edema, palpitations, dizziness, lightheadedness, syncope, claudication, or snoring/apnea. BP: 134/86 mmHg  ECG shows sinus rhythm at 59 bpm, normal AV conduction, normal QRS, asymmetric inverted t-waves in the inferolateral wall. PVC  The recommendations were to complete an stress test, Aspirin 81mg daily, No change in other medications, Check BP at home, ifestyle modification: Smoking cessation, weight management, routine physical activity.   ==========================  Nuclear stress test showed  1. No evidence of inducible myocardial ischemia. Predominantly fixed medium-sized moderate to severe perfusion defects involving mid to basal inferior as well as apical to basal inferoseptal wall with minimal reversibility in the apical septal wall, likely representing prior infarction with franck-infarct ischemia  2. The left ventricle is normal in size.  3. Gated images demonstrate irregular wall motion with hypokinesis in the mid to basal inferior as well as apical to basal inferoseptal wall and borderline normal LV EF estimated at 46%.  ==========================    He remains asymptomatic, no chest pain, no chest pressure, no palpitations, no shortness of breath.  We discussed the results of his recent nuclear stress test specifically I explained the importance of this lesion that it is compatible an old myocardial infarction.  Also I explained the importance of his mildly reduced ejection fraction and the fact that there is not ischemia on the test.  We also expand on his tobacco use.  He has been smoking for about 50 years right now he still smokes between 4 and 5 cigarettes a day, he lives with his wife who smokes more than him.  I explained in detail the risk and benefits of tobacco use and we agreed to refer him to the tobacco cessation clinic.    Past Medical History:  He has a past medical history of Essential (primary) hypertension (06/22/2022).    Past Surgical History:  He has a past  "surgical history that includes Knee arthroscopy w/ debridement (12/26/2013).    Social History:  He reports that he has been smoking cigarettes. He has never used smokeless tobacco. No history on file for alcohol use and drug use.    Family History:  No family history on file.  Allergies:  Lisinopril    Outpatient Medications:  Current Outpatient Medications   Medication Instructions    atorvastatin (LIPITOR) 40 mg, oral, Daily    dilTIAZem CD (CARDIZEM CD) 240 mg, oral, Daily     Last Recorded Vitals:  There were no vitals filed for this visit.    Physical Exam:      1/8/2025     9:37 AM 11/14/2024     9:13 AM 7/16/2024    12:37 PM 2/21/2024    11:30 AM 2/13/2024     1:30 PM 11/17/2023     9:09 AM   Vitals   Systolic 134 120 115 143 124 101   Diastolic 86 76 71 83 76 67   BP Location    Left arm     Heart Rate 62 67 63 87 82 62   Height 1.702 m (5' 7\") 1.702 m (5' 7\") 1.702 m (5' 7\") 1.702 m (5' 7\") 1.727 m (5' 8\") 1.727 m (5' 8\")   Weight (lb) 150 149 148 145.8 145 145   BMI 23.49 kg/m2 23.34 kg/m2 23.18 kg/m2 22.84 kg/m2 22.05 kg/m2 22.05 kg/m2   BSA (m2) 1.79 m2 1.79 m2 1.78 m2 1.77 m2 1.78 m2 1.78 m2   Visit Report Report Report Report Report Report Report     Wt Readings from Last 5 Encounters:   01/08/25 68 kg (150 lb)   11/14/24 67.6 kg (149 lb)   07/16/24 67.1 kg (148 lb)   02/21/24 66.1 kg (145 lb 12.8 oz)   02/13/24 65.8 kg (145 lb)       Physical Exam  Vitals reviewed.   HENT:      Head: Normocephalic.      Mouth/Throat:      Pharynx: Oropharynx is clear.   Eyes:      Pupils: Pupils are equal, round, and reactive to light.   Cardiovascular:      Rate and Rhythm: Normal rate.      Pulses: Normal pulses.      Heart sounds: Normal heart sounds.   Pulmonary:      Effort: Pulmonary effort is normal.      Breath sounds: Normal breath sounds.   Abdominal:      General: Abdomen is flat. Bowel sounds are normal.      Palpations: Abdomen is soft.   Musculoskeletal:         General: Normal range of motion. " "  Skin:     General: Skin is warm and dry.   Neurological:      General: No focal deficit present.      Mental Status: He is alert.   Psychiatric:         Mood and Affect: Mood normal.              Last Labs Reviewed:  CBC -  Recent Labs     07/16/24  1303 02/21/24  1416 04/26/23  1131 12/16/21  1130 12/21/20  1901   WBC 5.7 7.1 6.6 6.0 16.3*   HGB 13.9 14.0 13.8 13.5 11.9*   HCT 42.3 44.4 42.4 41.2 35.2*    255 231 240 291   MCV 91 94 91 92 88     CMP -  Recent Labs     07/16/24  1303 02/21/24  1416 04/26/23  1131 12/16/21  1130 12/21/20  1901    144 143 143 144   K 4.7 4.2 4.3 4.3 4.3    108* 106 108* 107   CO2 29 27 32 30 26   ANIONGAP 13 13 9* 9* 15   BUN 16 14 14 20 21   CREATININE 1.12 1.03 1.25 1.17 1.16   EGFR 71 79  --   --   --    CALCIUM 9.9 10.1 10.1 10.2 9.7     Recent Labs     07/16/24  1303 02/21/24  1416 04/26/23  1131 12/16/21  1130 11/18/20  1156   ALBUMIN 4.3 4.5 4.3 4.3 4.1   ALKPHOS 120 123 122 116 121   ALT 12 13 14 17 10   AST 15 14 15 18 12   BILITOT 0.5 0.7 0.6 0.5 0.4     LIPID PANEL -   Recent Labs     07/16/24  1303 02/21/24  1416 04/26/23  1131 12/16/21  1130 11/18/20  1156   CHOL 127 150 146 140 130   LDLF  --   --  77 81 76   LDLCALC 70 76  --   --   --    HDL 47.4 59.9 52.7 45.0 42.4   TRIG 49 71 83 68 57     COAGULATION PANEL -  No results for input(s): \"PTT\", \"INR\", \"HAUF\", \"DDIMERVTE\", \"HAPTOGLOBIN\", \"FIBRINOGEN\" in the last 36316 hours.  HEME/ENDO -  Recent Labs     02/21/24  1416   TSH 1.10     CARDIOVASCULAR  No results for input(s): \"LDH\", \"CKMB\", \"TROPHS\", \"BNP\", \"CRP\" in the last 80080 hours.    No lab exists for component: \"CK\", \"CKMBP\", \"CRPUS\", \"USCRP\"  Last Cardiology/Imaging Tests Personally Reviewed (if images available) and Interpreted:  ECG:  Encounter Date: 01/08/25   ECG 12 lead (Clinic Performed)    Narrative    sinus rhythm at 59 bpm, normal AV conduction, normal QRS, asymmetric   inverted t-waves in the inferolateral wall. PVC   No results " found for this or any previous visit from the past 1095 days.    Echocardiogram:  Echocardiogram     Narrative  [facility], [address] [address]  Tel [phone] and Fax [phone]    TRANSTHORACIC ECHOCARDIOGRAM REPORT      Patient Name:     LAWRENCE TORRES    Reading Physician:  [id number] Alexey Lang MD, JR.  Study Date:       4/26/2023           Referring           ALEXEY LANG  Physician:  MRN/PID:          [id number]            PCP:  Accession/Order#: [id number]        Department          [facility]  Location:  YOB: 1955           Fellow:  Gender:           M                   Nurse:  Admit Date:                           Sonographer:        Giovani Mar [facility] [[email or web address]]  Admission Status: Outpatient          Additional Staff:  Height:           172.72 cm           CC Report to:  Weight:           68.95 kg            Study Type:         Echocardiogram  BSA:              1.82 m2  Blood Pressure: 119 /65 mmHg    Diagnosis/ICD: I11.9-Hypertensive heart disease without heart failure  Indication:    Hypertensive heart disease  Procedure/CPT: Echo Complete w Full [id number]    Patient History:  Pertinent History: HTN, HLD, smoker.    Study Detail: The following Echo studies were performed: 2D, M-Mode, Doppler and  color flow. Technically challenging study due to prominent lung  artifact.      PHYSICIAN INTERPRETATION:  Left Ventricle: The left ventricular systolic function is normal, with an estimated ejection fraction of 55-60%. The calculated ejection fraction is normal at 57 % using the Cintron's Bi-plane MOD calculation. Wall motion is abnormal. The left ventricular cavity size is normal. There is mild to moderate concentric left ventricular hypertrophy. Spectral Doppler shows an impaired relaxation pattern of left ventricular diastolic filling. There is moderate to severe hypokinesis of the basal inferior wall.  Left Atrium: The left atrium is normal in  size.  Right Ventricle: The right ventricle is normal in size. There is normal right ventriclar wall thickness. There is normal right ventricular global systolic function.  Right Atrium: The right atrium is normal in size.  Aortic Valve: The aortic valve appears structurally normal. The aortic valve appears tricuspid and non-restricted. There is no evidence of aortic valve regurgitation. The peak instantaneous gradient of the aortic valve is 4.4 mmHg. The mean gradient of the aortic valve is 2.6 mmHg.  Mitral Valve: The mitral valve is normal in structure. There is normal mitral valve leaflet mobility. There is trace to mild mitral valve regurgitation.  Tricuspid Valve: The tricuspid valve is structurally normal. There is normal tricuspid valve leaflet mobility. There is trace tricuspid regurgitation.  Pulmonic Valve: The pulmonic valve is structurally normal. There is trace to mild pulmonic valve regurgitation.  Pericardium: There is a trivial pericardial effusion.  Aorta: The aortic root is normal. The Ao Sinus is 3.30 cm. The Asc Ao is 3.40 cm. The thoracic aorta diam is 3.2 cm. There is no dilatation of the aortic arch. There is upper limits of normal dilatation of the ascending aorta. There is no dilatation of the aortic root.  Pulmonary Artery: The pulmonary artery is normal in size. The estimated pulmonary artery pressure is normal.  Systemic Veins: The inferior vena cava appears to be of normal size. There is IVC inspiratory collapse greater than 50%.      CONCLUSIONS:  1. Left ventricular systolic function is normal with a 55-60% estimated ejection fraction.  2. Spectral Doppler shows an impaired relaxation pattern of left ventricular diastolic filling.  3. There is moderate to severe hypokinesis of the basal inferior wall.  4. Trace to mild mitral valve regurgitation.    QUANTITATIVE DATA SUMMARY:  2D MEASUREMENTS:  Normal Ranges:  LAs:           3.60 cm    (2.7-4.0cm)  IVSd:          1.16 cm     (0.6-1.1cm)  LVPWd:         1.17 cm    (0.6-1.1cm)  LVIDd:         4.84 cm    (3.9-5.9cm)  LVIDs:         3.31 cm  LV Mass Index: 117.6 g/m2  LV % FS        31.6 %    LA VOLUME:  Normal Ranges:  LA Vol A4C:        75.0 ml    (22+/-6mL/m2)  LA Vol A2C:        104.4 ml  LA Vol BP:         89.2 ml  LA Vol Index A4C:  41.2ml/m2  LA Vol Index A2C:  57.4 ml/m2  LA Vol Index BP:   49.1 ml/m2  LA Area A4C:       23.2 cm2  LA Area A2C:       27.6 cm2  LA Major Axis A4C: 6.1 cm  LA Major Axis A2C: 6.2 cm  LA Vol A4C:        69.5 ml  LA Vol A2C:        101.2 ml    RA VOLUME BY A/L METHOD:  Normal Ranges:  RA Vol A4C:        58.9 ml    (8.3-19.5ml)  RA Vol Index A4C:  32.4 ml/m2  RA Area A4C:       18.8 cm2  RA Major Axis A4C: 5.1 cm    AORTA MEASUREMENTS:  Normal Ranges:  Ao Sinus, d: 3.30 cm (2.1-3.5cm)  Ao STJ, d:   2.90 cm (1.7-3.4cm)  Asc Ao, d:   3.40 cm (2.1-3.4cm)  Ao Arch:     3.20 cm (2.0-3.6cm)    LV SYSTOLIC FUNCTION BY 2D PLANIMETRY (MOD):  Normal Ranges:  EF-A4C View: 60.2 % (>=55%)  EF-A2C View: 56.4 %  EF-Biplane:  56.7 %    LV DIASTOLIC FUNCTION:  Normal Ranges:  MV Peak E:        0.58 m/s   (0.7-1.2 m/s)  MV Peak A:        0.75 m/s   (0.42-0.7 m/s)  E/A Ratio:        0.77       (1.0-2.2)  MV lateral e'     0.07 m/s  MV medial e'      0.05 m/s  PulmV Sys Abdirashid:    45.63 cm/s  PulmV Phan Abdirashid:   36.14 cm/s  PulmV S/D Abdirashid:    1.26  PulmV A Revs Abdirashid: 34.17 cm/s    MITRAL VALVE:  Normal Ranges:  MV DT: 217 msec (150-240msec)    AORTIC VALVE:  Normal Ranges:  AoV Vmax:                1.05 m/s (<=1.7m/s)  AoV Peak P.4 mmHg (<20mmHg)  AoV Mean P.6 mmHg (1.7-11.5mmHg)  LVOT Max Abdirashid:            0.91 m/s (<=1.1m/s)  AoV VTI:                 23.08 cm (18-25cm)  LVOT VTI:                19.79 cm  LVOT Diameter:           2.22 cm  (1.8-2.4cm)  AoV Area, VTI:           3.31 cm2 (2.5-5.5cm2)  AoV Area,Vmax:           3.34 cm2 (2.5-4.5cm2)  AoV Dimensionless Index: 0.86    RIGHT VENTRICLE:  RV 1    3.7 cm  RV 2   3.0 cm  RV 3   7.3 cm  TAPSE: 20.3 mm  RV s'  0.10 m/s    TRICUSPID VALVE/RVSP:  Normal Ranges:  IVC Diam: 1.90 cm    PULMONIC VALVE:  Normal Ranges:  PV Accel Time: 117 msec (>120ms)  PV Max Abdirashid:    0.7 m/s  (0.6-0.9m/s)  PV Max P.2 mmHg    Pulmonary Veins:  PulmV A Revs Abdirashid: 34.17 cm/s  PulmV Phan Abdirashid:   36.14 cm/s  PulmV S/D Abdirashid:    1.26  PulmV Sys Abdirashid:    45.63 cm/s      [id number] Alexey Lang MD  Electronically signed on 2023 at 5:26:55 PM        No results found for this or any previous visit from the past 1095 days.    Cath:  No results found for this or any previous visit from the past 1825 days.  No results found for this or any previous visit from the past 3650 days.  No results found for this or any previous visit from the past 1095 days.    Stress Test:  No results found for this or any previous visit from the past 1825 days.  No results found for this or any previous visit from the past 1095 days.    Cardiac CT/MRI:  No results found for this or any previous visit from the past 1825 days.  No results found for this or any previous visit from the past 1095 days.    Other CT:      CV RISK FACTORS:   # Hypertension: Last BP:  .  # Hyperlipidemia: Last Tchol 127 / LDL No results found for requested labs within last 365 days. / HDL 47.4 / TRIG 49 (2024:  1:03 PM).  # Type II Diabetes Mellitus: Last A1c No results found for requested labs within last 365 days. (No results in last year.).  # Obesity: Last BMI:  .  # CKD: Last BUN/Cr (GFR): .12 (71), 2024:  1:03 PM.    ASCV RISK:  The ASCVD Risk score (Tom CALDERON, et al., 2019) failed to calculate for the following reasons:    The valid total cholesterol range is 130 to 320 mg/dL    Assessment/Plan   Hector Otoole Jr. is a 69 y.o. male, with a history of hypertension, hypertensive heart disease with left ventricular hypertrophy (LVH), and hyperlipidemia, presenting for evaluation due to an elevated coronary artery  calcium (CAC) score. His medical history is also significant for chronic anemia (stable), a 30-pack-year smoking history (currently minimal smoking, attempting cessation), and ascending and abdominal aortic aneurysms under vascular surveillance. He denies symptoms of ischemic heart disease or heart failure, including chest pain, dyspnea, orthopnea, or syncope.  Key Findings:  -Imaging:  -CT (11/14/2024): CAC score 1005 (>=85th percentile compared to Tripp cohort), borderline dilated ascending thoracic aorta (4.0 cm, unchanged).  -Echocardiography (2023): Normal left ventricular systolic function (EF 55-60%), diastolic dysfunction with impaired relaxation, moderate to severe hypokinesis of the basal inferior wall, trace-to-mild mitral regurgitation.  Labs: Lipid profile optimized: LDL 70, Non-HDL 80. Creatinine 1.12, liver enzymes within normal limits.   -ECG: Sinus rhythm at 59 bpm, asymmetric inverted T-waves in the inferolateral wall, PVCs.  -Nuclear stress test showed: No evidence of inducible myocardial ischemia. Predominantly fixed medium-sized moderate to severe perfusion defects involving mid to basal inferior as well as apical to basal inferoseptal wall with minimal reversibility in the apical septal wall, likely representing prior infarction with franck-infarct ischemia The left ventricle is normal in size. Gated images demonstrate irregular wall motion with hypokinesis in the mid to basal inferior as well as apical to basal inferoseptal wall and borderline normal LV EF estimated at 46%.    Initial Diagnoses/Differential Diagnoses:  #Coronary artery disease (CAD): Significant coronary calcifications, inferolateral T-wave inversion, and basal inferior wall hypokinesis are concerning for underlying CAD.  #Old MI inferior wall: Evidence of prior silent inferior/inferoseptal myocardial infarction with franck-infarct ischemia, consistent with fixed defects and regional wall motion abnormalities.  #Borderline reduced  LV function (EF 46%), possibly related to prior infarct burden.  #Hypertensive heart disease: History of well-controlled hypertension with associated LVH.  #Diastolic dysfunction: Impaired LV relaxation, likely secondary to hypertensive heart disease.  #Aneurysmal disease: Stable ascending and abdominal aortic aneurysms under surveillance.  #Chronic anemia: Currently asymptomatic but relevant for cardiac workload and oxygen delivery.    Assessment and Plan:  #Coronary Artery Disease (CAD):  -Stress imaging study (nuclear stress test) was positive for old MI and negative for ischemia  -Initiate aspirin 81 mg daily  -I emphasize aggressive risk factor modification, including smoking cessation -> Referral for clinic    #Hypertensive Heart Disease and LVH:  -Stop diltiazem   -Start Valsartan 160mg  -Start metoprolol XR 50 mg    #Hyperlipidemia:  -Continue atorvastatin 40 mg daily. Goal LDL remains <70 mg/dL.    #Aneurysmal Disease:  -Continue vascular surveillance for thoracic and abdominal aortic aneurysms. Reassess imaging intervals with vascular surgery team.  Chronic Anemia:  -Periodic monitoring of hemoglobin and iron studies. Evaluate for progression or symptoms if clinically indicated.    Smoking education  -Cigarette smoking remains a leading contributor to cardiovascular morbidity and mortality worldwide. In [place] adults, smoking accounts for approximately 20 percent of cardiovascular deaths and almost one-third of deaths from coronary heart disease (CHD). Smoking is associated with an increased risk of death from heart disease across all age groups. The cardiovascular benefits of stopping smoking accrue almost immediately after quitting and increase over time. After one year of not smoking, the excess risk for CHD falls to 50 percent of that of current smokers. Even quitting after age 70 can reduce all-cause death  -In patients without known coronary heart disease (CHD), the reduction in cardiac event  rate associated with smoking cessation ranges from 7 to 47 percent.  -Is important to know that quitting smoking is one of the most important things you can do to protect your health now and in the future.   -Free telephone quitline> 6-424-QUIT-NOW or 1-688.184.9735 can be used    Recommendations:  -Continue aspirin 81 and atorvastatin 40 mg daily - LDL at goal (<70)  -Start Metoprolol XR 50mg  -Also stop CCB / Management of hypertension with ARB  - Start Valsartan 160mg once a day  -Check BP at home  -Lifestyle modification: Smoking cessation, weight management, routine physical activity.   -I do a referral for Tobacco cessation program  -Encourage adherence to current medication regimen and monitor for adverse effects.  -Patient education provided on the importance of smoking cessation, cardiovascular risk management, and adherence to follow-up appointments.    - Patient will follow up with me in the Cardiology office in 6 months  - I spent 35 minutes assessing the case between pre-charting, face-to-face patient interaction, and documentation    Shawn Castillo MD

## 2025-04-16 NOTE — PATIENT INSTRUCTIONS
-Continue aspirin 81 and atorvastatin 40 mg daily   -Start Metoprolol XR 50mg  -Stop Diltiazem  - Start Valsartan 160mg once a day  -Check BP at home  -Lifestyle modification: Smoking cessation, weight management, routine physical activity.   -I do a referral for Tobacco cessation program  -Encourage adherence to current medication regimen and monitor for adverse effects.  -Patient education provided on the importance of smoking cessation, cardiovascular risk management, and adherence to follow-up appointments.    - Patient will follow up with me in the Cardiology office in 6 months

## 2025-04-30 ENCOUNTER — CLINICAL SUPPORT (OUTPATIENT)
Dept: CARDIAC REHAB | Facility: CLINIC | Age: 70
End: 2025-04-30
Payer: MEDICARE

## 2025-04-30 DIAGNOSIS — R93.1 ELEVATED CORONARY ARTERY CALCIUM SCORE: ICD-10-CM

## 2025-04-30 DIAGNOSIS — I10 PRIMARY HYPERTENSION: ICD-10-CM

## 2025-04-30 DIAGNOSIS — I50.22 HEART FAILURE WITH MID-RANGE EJECTION FRACTION (HFMEF): ICD-10-CM

## 2025-04-30 DIAGNOSIS — F17.210 CIGARETTE SMOKER: ICD-10-CM

## 2025-04-30 DIAGNOSIS — I25.2 OLD MI (MYOCARDIAL INFARCTION): ICD-10-CM

## 2025-04-30 DIAGNOSIS — I25.10 CORONARY ARTERY DISEASE INVOLVING NATIVE HEART WITHOUT ANGINA PECTORIS, UNSPECIFIED VESSEL OR LESION TYPE: ICD-10-CM

## 2025-04-30 DIAGNOSIS — R94.30 WALL MOTION ABNORMALITY OF INFERIOR WALL OF LEFT VENTRICLE: ICD-10-CM

## 2025-04-30 PROCEDURE — 99407 BEHAV CHNG SMOKING > 10 MIN: CPT | Performed by: INTERNAL MEDICINE

## 2025-04-30 NOTE — PROGRESS NOTES
Tobacco Treatment Counseling Initial Visit    Name: Hector Otoole Jr.            MRN: [id number]          YOB: 1955           Age: 70 y.o.                  Today’s Date: 4/30/2025  Primary Care Physician: Christopher D'Amico, DO  Referring Physician: Shawn Castillo, *  [place] [facility] [place] [id number]         Start time: 1:39 PM    End time: 2:09 PM       Hector tOoole Jr. presents for initial session for Tobacco Treatment Counseling. Patient currently smoking 6 CPD (does relight them frequently) and has been smoking since the age of 16. Patient has a moderate dependence on nicotine according to the Fagerstrom nicotine dependence assessment. At this time, patient is motivated to quit smoking due to various concerns. He feels as though it is time. He has a grandchild who he wants to quit for as well.  See below for detailed assessment of tobacco use and treatment plan.     Smokes within an hour of waking in the morning. Does fine in public places, can go about a day or so without smoking before experiencing intense urges. He would struggle the most without the first cigarette of the day.     Smoking triggers/routines:  with coffee, after eating meals, driving (sometimes but not often), is not bothered by people smoking around him, also not bothered by stress or anxiety     Past quit attempts:  has quit before, the last time was 8-9 years ago, quit for about 2-3 years.. does not remember anything in particular that helped him, relied on willpower. Denies withdrawal symptoms at that time. He has experienced some withdrawal symptoms when going long periods without smoking, mostly just thoughts about smoking. Does not remember reason for relapse.     Potential barriers to quitting:  does live with his wife who smokes, she says she wants to quit but he thinks this will be very difficult for her... they do smoke inside the home but limit it to one room     Strengths:  highly motivated to  quit, experience quitting in the past     Medication plan:  has never used medication but is open to it.. provided education on all options.. open to trying NRT, will begin combo therapy 14 mg patch + 4 mg lozenges     Coping strategies:  distraction techniques, subs, delaying     Next steps:  start the nicotine patch 14 mg + 4 mg lozenges, follow up two weeks to assess progress and discuss next steps in quit plan      Follow-up scheduled 5/14/25. Patient to call office at [phone] with any questions/concerns.      Christelle Wynn RN

## 2025-05-01 RX ORDER — IBUPROFEN 200 MG
1 TABLET ORAL EVERY 24 HOURS
Qty: 56 PATCH | Refills: 0 | Status: SHIPPED | OUTPATIENT
Start: 2025-05-01 | End: 2025-06-26

## 2025-05-01 RX ORDER — ASPIRIN/CALCIUM CARB/MAGNESIUM 325 MG
4 TABLET ORAL EVERY 2 HOUR PRN
Qty: 100 LOZENGE | Refills: 2 | Status: SHIPPED | OUTPATIENT
Start: 2025-05-01 | End: 2025-05-31

## 2025-05-14 ENCOUNTER — APPOINTMENT (OUTPATIENT)
Dept: CARDIAC REHAB | Facility: CLINIC | Age: 70
End: 2025-05-14
Payer: MEDICARE

## 2025-05-14 DIAGNOSIS — F17.210 CIGARETTE SMOKER: ICD-10-CM

## 2025-05-14 PROCEDURE — 99406 BEHAV CHNG SMOKING 3-10 MIN: CPT | Performed by: INTERNAL MEDICINE

## 2025-05-15 ENCOUNTER — APPOINTMENT (OUTPATIENT)
Dept: PRIMARY CARE | Facility: CLINIC | Age: 70
End: 2025-05-15
Payer: MEDICARE

## 2025-05-15 VITALS
OXYGEN SATURATION: 96 % | DIASTOLIC BLOOD PRESSURE: 78 MMHG | BODY MASS INDEX: 23.7 KG/M2 | HEIGHT: 67 IN | HEART RATE: 65 BPM | SYSTOLIC BLOOD PRESSURE: 121 MMHG | WEIGHT: 151 LBS

## 2025-05-15 DIAGNOSIS — E55.9 VITAMIN D DEFICIENCY: ICD-10-CM

## 2025-05-15 DIAGNOSIS — I25.10 CORONARY ARTERY DISEASE INVOLVING NATIVE HEART WITHOUT ANGINA PECTORIS, UNSPECIFIED VESSEL OR LESION TYPE: ICD-10-CM

## 2025-05-15 DIAGNOSIS — I11.9 HYPERTENSIVE HEART DISEASE WITHOUT HEART FAILURE: ICD-10-CM

## 2025-05-15 DIAGNOSIS — M54.50 CHRONIC LOW BACK PAIN, UNSPECIFIED BACK PAIN LATERALITY, UNSPECIFIED WHETHER SCIATICA PRESENT: ICD-10-CM

## 2025-05-15 DIAGNOSIS — D64.9 CHRONIC ANEMIA: ICD-10-CM

## 2025-05-15 DIAGNOSIS — G89.29 CHRONIC LOW BACK PAIN, UNSPECIFIED BACK PAIN LATERALITY, UNSPECIFIED WHETHER SCIATICA PRESENT: ICD-10-CM

## 2025-05-15 DIAGNOSIS — I10 HYPERTENSION, UNSPECIFIED TYPE: Primary | ICD-10-CM

## 2025-05-15 DIAGNOSIS — E78.5 HYPERLIPIDEMIA, UNSPECIFIED HYPERLIPIDEMIA TYPE: ICD-10-CM

## 2025-05-15 DIAGNOSIS — I71.40 ABDOMINAL AORTIC ANEURYSM (AAA) WITHOUT RUPTURE, UNSPECIFIED PART: ICD-10-CM

## 2025-05-15 DIAGNOSIS — Z12.5 SCREENING FOR PROSTATE CANCER: ICD-10-CM

## 2025-05-15 LAB
25(OH)D3+25(OH)D2 SERPL-MCNC: 18 NG/ML (ref 30–100)
ALBUMIN SERPL-MCNC: 4.1 G/DL (ref 3.6–5.1)
ALP SERPL-CCNC: 89 U/L (ref 35–144)
ALT SERPL-CCNC: 9 U/L (ref 9–46)
ANION GAP SERPL CALCULATED.4IONS-SCNC: 7 MMOL/L (CALC) (ref 7–17)
AST SERPL-CCNC: 13 U/L (ref 10–35)
BILIRUB SERPL-MCNC: 0.7 MG/DL (ref 0.2–1.2)
BUN SERPL-MCNC: 15 MG/DL (ref 7–25)
CALCIUM SERPL-MCNC: 9.2 MG/DL (ref 8.6–10.3)
CHLORIDE SERPL-SCNC: 108 MMOL/L (ref 98–110)
CHOLEST SERPL-MCNC: 188 MG/DL
CHOLEST/HDLC SERPL: 3.7 (CALC)
CO2 SERPL-SCNC: 27 MMOL/L (ref 20–32)
CREAT SERPL-MCNC: 1 MG/DL (ref 0.7–1.28)
EGFRCR SERPLBLD CKD-EPI 2021: 81 ML/MIN/1.73M2
ERYTHROCYTE [DISTWIDTH] IN BLOOD BY AUTOMATED COUNT: 14.4 % (ref 11–15)
GLUCOSE SERPL-MCNC: 98 MG/DL (ref 65–99)
HCT VFR BLD AUTO: 43.1 % (ref 38.5–50)
HDLC SERPL-MCNC: 51 MG/DL
HGB BLD-MCNC: 14.1 G/DL (ref 13.2–17.1)
LDLC SERPL CALC-MCNC: 117 MG/DL (CALC)
MCH RBC QN AUTO: 30.2 PG (ref 27–33)
MCHC RBC AUTO-ENTMCNC: 32.7 G/DL (ref 32–36)
MCV RBC AUTO: 92.3 FL (ref 80–100)
NONHDLC SERPL-MCNC: 137 MG/DL (CALC)
PLATELET # BLD AUTO: 202 THOUSAND/UL (ref 140–400)
PMV BLD REES-ECKER: 10.6 FL (ref 7.5–12.5)
POTASSIUM SERPL-SCNC: 3.8 MMOL/L (ref 3.5–5.3)
PROT SERPL-MCNC: 6.8 G/DL (ref 6.1–8.1)
PSA SERPL-MCNC: 0.8 NG/ML
RBC # BLD AUTO: 4.67 MILLION/UL (ref 4.2–5.8)
SODIUM SERPL-SCNC: 142 MMOL/L (ref 135–146)
TRIGL SERPL-MCNC: 96 MG/DL
TSH SERPL-ACNC: 1.25 MIU/L (ref 0.4–4.5)
WBC # BLD AUTO: 4.4 THOUSAND/UL (ref 3.8–10.8)

## 2025-05-15 PROCEDURE — 3074F SYST BP LT 130 MM HG: CPT | Performed by: STUDENT IN AN ORGANIZED HEALTH CARE EDUCATION/TRAINING PROGRAM

## 2025-05-15 PROCEDURE — 1160F RVW MEDS BY RX/DR IN RCRD: CPT | Performed by: STUDENT IN AN ORGANIZED HEALTH CARE EDUCATION/TRAINING PROGRAM

## 2025-05-15 PROCEDURE — 3078F DIAST BP <80 MM HG: CPT | Performed by: STUDENT IN AN ORGANIZED HEALTH CARE EDUCATION/TRAINING PROGRAM

## 2025-05-15 PROCEDURE — 1159F MED LIST DOCD IN RCRD: CPT | Performed by: STUDENT IN AN ORGANIZED HEALTH CARE EDUCATION/TRAINING PROGRAM

## 2025-05-15 PROCEDURE — 99214 OFFICE O/P EST MOD 30 MIN: CPT | Performed by: STUDENT IN AN ORGANIZED HEALTH CARE EDUCATION/TRAINING PROGRAM

## 2025-05-15 PROCEDURE — 3008F BODY MASS INDEX DOCD: CPT | Performed by: STUDENT IN AN ORGANIZED HEALTH CARE EDUCATION/TRAINING PROGRAM

## 2025-05-15 PROCEDURE — G2211 COMPLEX E/M VISIT ADD ON: HCPCS | Performed by: STUDENT IN AN ORGANIZED HEALTH CARE EDUCATION/TRAINING PROGRAM

## 2025-05-15 RX ORDER — DILTIAZEM HYDROCHLORIDE 240 MG/1
1 CAPSULE, COATED, EXTENDED RELEASE ORAL
COMMUNITY
Start: 2025-04-25 | End: 2025-05-15 | Stop reason: ALTCHOICE

## 2025-05-15 RX ORDER — NAPROXEN SODIUM 220 MG/1
1 TABLET, FILM COATED ORAL
COMMUNITY
Start: 2025-04-16 | End: 2025-05-15 | Stop reason: WASHOUT

## 2025-05-15 ASSESSMENT — COLUMBIA-SUICIDE SEVERITY RATING SCALE - C-SSRS
1. IN THE PAST MONTH, HAVE YOU WISHED YOU WERE DEAD OR WISHED YOU COULD GO TO SLEEP AND NOT WAKE UP?: NO
6. HAVE YOU EVER DONE ANYTHING, STARTED TO DO ANYTHING, OR PREPARED TO DO ANYTHING TO END YOUR LIFE?: NO
2. HAVE YOU ACTUALLY HAD ANY THOUGHTS OF KILLING YOURSELF?: NO

## 2025-05-15 ASSESSMENT — ENCOUNTER SYMPTOMS
DEPRESSION: 0
LOSS OF SENSATION IN FEET: 0
OCCASIONAL FEELINGS OF UNSTEADINESS: 0

## 2025-05-15 NOTE — PROGRESS NOTES
70-year-old male presenting for follow-up on multiple concerns.  Doing relatively well without any significant new concerns or interval changes.    HTN, HLD, hypertensive heart disease, LVH  Stable, tolerating new regimen well.  Asymptomatic.    Chronic anemia  Stable, asymptomatic    Aortic aneurysm  Following with vascular surgery, annual surveillance    Nicotine use disorder  He is using patches, down to about 2 cigarettes a day over the past week    Chronic low back pain  Stable    SocHx:   - Smoking: Approximately 30-pack-year history, still smoking, only several cigarettes a week    12 point ROS reviewed and negative other than as stated in HPI      General: Alert, oriented, pleasant, in no acute distress  HEENT:      Head: normocephalic, atraumatic;      eyes: EOMI, no scleral icterus;   Neck: soft, supple, non-tender, no masses appreciated  CV: Heart with regular rate and rhythm, normal S1/S2, no murmurs  Lungs: CTAB without wheezing, rhonchi or rales; good respiratory effort, no increased work of breathing  Neuro: Cranial nerves grossly intact; alert and oriented, normal gait  Psych: Appropriate mood and affect    #HTN #Hypertensive heart disease #LVH  -BP at goal in office  -Continue valsartan 160 mg every day, metoprolol succinate 50 mg qd  -Repeat CMP    #HLD #CAD  -Continue atorvastatin 40 mg daily, ASA 81 mg qd  - Repeat lipid panel  - Follow-up with cardiology    #Chronic anemia  -Repeat CBC    #Ascending aorta aneurysm #fusiform abdominal aneurysm  - Following with vascular for annual surveillance    #Nicotine use disorder  -Using patches and lozenges, down to approximately 2 cigarettes a day over the last week    #Chronic low back pain  - Disability placard    F/U 6 months, sooner if indicated, Medicare at that time    Chris D'Amico, DO

## 2025-05-18 NOTE — RESULT ENCOUNTER NOTE
, good HDL at 51.  Would like to see that lower.  Hopefully he is taking his atorvastatin every day.  Continue to follow cardiology.    Vitamin D moderately low at 18, recommend OTC vitamin D3, 2000 units daily.    Remaining labs unremarkable.

## 2025-05-22 ENCOUNTER — APPOINTMENT (OUTPATIENT)
Dept: CARDIAC REHAB | Facility: CLINIC | Age: 70
End: 2025-05-22
Payer: MEDICARE

## 2025-05-22 ENCOUNTER — TELEPHONE (OUTPATIENT)
Dept: PRIMARY CARE | Facility: CLINIC | Age: 70
End: 2025-05-22

## 2025-05-22 DIAGNOSIS — F17.210 CIGARETTE SMOKER: ICD-10-CM

## 2025-05-22 PROCEDURE — 99406 BEHAV CHNG SMOKING 3-10 MIN: CPT | Mod: 95 | Performed by: INTERNAL MEDICINE

## 2025-05-22 NOTE — TELEPHONE ENCOUNTER
Result Communication    Resulted Orders   CBC   Result Value Ref Range    WHITE BLOOD CELL COUNT 4.4 3.8 - 10.8 Thousand/uL    RED BLOOD CELL COUNT 4.67 4.20 - 5.80 Million/uL    HEMOGLOBIN 14.1 13.2 - 17.1 g/dL    HEMATOCRIT 43.1 38.5 - 50.0 %    MCV 92.3 80.0 - 100.0 fL    MCH 30.2 27.0 - 33.0 pg    MCHC 32.7 32.0 - 36.0 g/dL      Comment:      For adults, a slight decrease in the calculated MCHC  value (in the range of 30 to 32 g/dL) is most likely  not clinically significant; however, it should be  interpreted with caution in correlation with other  red cell parameters and the patient's clinical  condition.      RDW 14.4 11.0 - 15.0 %    PLATELET COUNT 202 140 - 400 Thousand/uL    MPV 10.6 7.5 - 12.5 fL    Narrative    FASTING:YES  AN UPDATE OR CORRECTION HAS BEEN MADE TO NAME    FASTING: YES   Comprehensive Metabolic Panel   Result Value Ref Range    GLUCOSE 98 65 - 99 mg/dL      Comment:                    Fasting reference interval         UREA NITROGEN (BUN) 15 7 - 25 mg/dL    CREATININE 1.00 0.70 - 1.28 mg/dL    EGFR 81 > OR = 60 mL/min/1.73m2    SODIUM 142 135 - 146 mmol/L    POTASSIUM 3.8 3.5 - 5.3 mmol/L    CHLORIDE 108 98 - 110 mmol/L    CARBON DIOXIDE 27 20 - 32 mmol/L    ELECTROLYTE BALANCE 7 7 - 17 mmol/L (calc)    CALCIUM 9.2 8.6 - 10.3 mg/dL    PROTEIN, TOTAL 6.8 6.1 - 8.1 g/dL    ALBUMIN 4.1 3.6 - 5.1 g/dL    BILIRUBIN, TOTAL 0.7 0.2 - 1.2 mg/dL    ALKALINE PHOSPHATASE 89 35 - 144 U/L    AST 13 10 - 35 U/L    ALT 9 9 - 46 U/L    Narrative    FASTING:YES  AN UPDATE OR CORRECTION HAS BEEN MADE TO NAME    FASTING: YES   Lipid Panel   Result Value Ref Range    CHOLESTEROL, TOTAL 188 <200 mg/dL    HDL CHOLESTEROL 51 > OR = 40 mg/dL    TRIGLYCERIDES 96 <150 mg/dL    LDL-CHOLESTEROL 117 (H) mg/dL (calc)      Comment:      Reference range: <100     Desirable range <100 mg/dL for primary prevention;    <70 mg/dL for patients with CHD or diabetic patients   with > or = 2 CHD risk factors.     LDL-C is now  calculated using the Angelo   calculation, which is a validated novel method providing   better accuracy than the Friedewald equation in the   estimation of LDL-C.   Hermes BRAR et al. RC. 2013;310(19): 1125-3118   (http://education.[employer]/faq/WWL043)      CHOL/HDLC RATIO 3.7 <5.0 (calc)    NON HDL CHOLESTEROL 137 (H) <130 mg/dL (calc)      Comment:      For patients with diabetes plus 1 major ASCVD risk   factor, treating to a non-HDL-C goal of <100 mg/dL   (LDL-C of <70 mg/dL) is considered a therapeutic   option.      Narrative    FASTING:YES  AN UPDATE OR CORRECTION HAS BEEN MADE TO NAME    FASTING: YES   TSH with reflex to Free T4 if abnormal   Result Value Ref Range    TSH W/REFLEX TO FT4 1.25 0.40 - 4.50 mIU/L    Narrative    FASTING:YES  AN UPDATE OR CORRECTION HAS BEEN MADE TO NAME    FASTING: YES   Vitamin D 25-Hydroxy,Total (for eval of Vitamin D levels)   Result Value Ref Range    VITAMIN D,25-OH,TOTAL,IA 18 (L) 30 - 100 ng/mL      Comment:      Vitamin D Status         25-OH Vitamin D:     Deficiency:                    <20 ng/mL  Insufficiency:             20 - 29 ng/mL  Optimal:                 > or = 30 ng/mL     For 25-OH Vitamin D testing on patients on   D2-supplementation and patients for whom quantitation   of D2 and D3 fractions is required, the QuestAssureD(TM)  25-OH VIT D, (D2,D3), LC/MS/MS is recommended: order   code 48387 (patients >2yrs).     See Note 1     Note 1     For additional information, please refer to   http://education.[employer]/faq/BBY459   (This link is being provided for informational/  educational purposes only.)      Narrative    FASTING:YES  AN UPDATE OR CORRECTION HAS BEEN MADE TO NAME    FASTING: YES   Prostate Specific Antigen   Result Value Ref Range    PSA, TOTAL 0.80 < OR = 4.00 ng/mL      Comment:      The total PSA value from this assay system is   standardized against the WHO standard. The test   result will be approximately 20%  lower when compared   to the equimolar-standardized total PSA ([employer]   Randall). Comparison of serial PSA results should be   interpreted with this fact in mind.     This test was performed using the Siemens   chemiluminescent method. Values obtained from   different assay methods cannot be used  interchangeably. PSA levels, regardless of  value, should not be interpreted as absolute  evidence of the presence or absence of disease.      Narrative    FASTING:YES  AN UPDATE OR CORRECTION HAS BEEN MADE TO NAME    FASTING: YES       12:59 PM      Results were not successfully communicated with the patient and they did not acknowledge their understanding.

## 2025-05-22 NOTE — PROGRESS NOTES
Tobacco Treatment Counseling Follow Up Visit    Name: Hector Otoole Jr.            MRN: [id number]          YOB: 1955           Age: 70 y.o.                  Today's Date: 5/22/2025  Primary Care Physician: Christopher D'Amico, DO  Referring Physician: No ref. provider found  [place] [facility] [place] [id number]       Virtual or Telephone Consent    An interactive audio and video telecommunication system which permits real time communications between the patient (at the originating site) and provider (at the distant site) was utilized to provide this telehealth service.   Verbal consent was requested and obtained from Hector Otoole Jr. on this date, 05/22/25 for a telehealth visit and the patient's location was confirmed at the time of the visit.    Start time: 11:03 AM    End time: 11:09 AM     Hector Otoole Jr. presents for follow up session for Tobacco Treatment Counseling. UPDATES: Patient states that things are going well with cessation efforts. He is using both the nicotine patches and the lozenges. Patient says that any time he gets a craving he uses the lozenges. He has cut down to about 1 CPD. Has experienced some slight withdrawal symptoms such as hunger cravings, but says they are very tolerable. Patient feels as though he will be ready to attempt complete cessation within the next week or two.   Plan to follow up in one week on 5/29/25.       Christelle Wynn RN

## 2025-05-29 ENCOUNTER — APPOINTMENT (OUTPATIENT)
Dept: CARDIAC REHAB | Facility: CLINIC | Age: 70
End: 2025-05-29
Payer: MEDICARE

## 2025-05-29 DIAGNOSIS — F17.210 CIGARETTE SMOKER: ICD-10-CM

## 2025-05-29 PROCEDURE — 99406 BEHAV CHNG SMOKING 3-10 MIN: CPT | Mod: 95 | Performed by: INTERNAL MEDICINE

## 2025-05-29 NOTE — PROGRESS NOTES
Tobacco Treatment Counseling Follow Up Visit    Name: Hector Otoole Jr.            MRN: [id number]          YOB: 1955           Age: 70 y.o.                  Today's Date: 5/29/2025  Primary Care Physician: Christopher D'Amico, DO  Referring Physician: No ref. provider found  Program Location[place] [facility] [place] [id number]       Virtual or Telephone Consent    An interactive audio and video telecommunication system which permits real time communications between the patient (at the originating site) and provider (at the distant site) was utilized to provide this telehealth service.   Verbal consent was requested and obtained from Hector Otoole Jr. on this date, 05/29/25 for a telehealth visit and the patient's location was confirmed at the time of the visit.    Start time: 11:01 AM    End time: 11:07 AM     Hector Otoole Jr. presents for follow up session for Tobacco Treatment Counseling. UPDATES: Since previous session, patient has maintained his use at 1 CPD.  Denies any withdrawal symptoms/intense urges. He is overall feeling good both physically and mentally.   Patient feels like he is ready to attempt a complete quit in about one week. Advised him to get rid of all tobacco products, ashtrays, lighters etc. Before his quit day. Will plan to check in next week to see how he's doing with cessation efforts. Follow up scheduled 6/9.       Christelle Wynn RN

## 2025-06-09 ENCOUNTER — TELEMEDICINE CLINICAL SUPPORT (OUTPATIENT)
Dept: CARDIAC REHAB | Facility: HOSPITAL | Age: 70
End: 2025-06-09
Payer: MEDICARE

## 2025-06-09 DIAGNOSIS — F17.210 CIGARETTE SMOKER: ICD-10-CM

## 2025-06-09 PROCEDURE — 99406 BEHAV CHNG SMOKING 3-10 MIN: CPT | Mod: 95 | Performed by: INTERNAL MEDICINE

## 2025-06-09 NOTE — PROGRESS NOTES
Tobacco Treatment Counseling Follow Up Visit    Name: Hector Otoole Jr.            MRN: [id number]          YOB: 1955           Age: 70 y.o.                  Today's Date: 6/9/2025  Primary Care Physician: Christopher D'Amico, DO  Referring Physician: No ref. provider found  Program Location: [place] [address] [facility]       Virtual or Telephone Consent    An interactive audio and video telecommunication system which permits real time communications between the patient (at the originating site) and provider (at the distant site) was utilized to provide this telehealth service.   Verbal consent was requested and obtained from Hector Otoole Jr. on this date, 06/09/25 for a telehealth visit and the patient's location was confirmed at the time of the visit.    Start time: 11:38 AM    End time: 11:44 AM    Hector Otoole Jr. presents for follow up session for Tobacco Treatment Counseling. UPDATES: Patient states that he has not smoked since this past Saturday, 6/7. He reports feeling good both physically and mentally. He is continuing to use the nicotine replacement therapy.   Will continue to follow closely for relapse prevention support. Follow up scheduled 6/16/25.       Christelle Wynn RN

## 2025-06-16 ENCOUNTER — APPOINTMENT (OUTPATIENT)
Dept: CARDIAC REHAB | Facility: HOSPITAL | Age: 70
End: 2025-06-16
Payer: MEDICARE

## 2025-06-16 DIAGNOSIS — F17.210 CIGARETTE SMOKER: ICD-10-CM

## 2025-06-16 PROCEDURE — 99406 BEHAV CHNG SMOKING 3-10 MIN: CPT | Mod: 95 | Performed by: INTERNAL MEDICINE

## 2025-06-16 NOTE — PROGRESS NOTES
Tobacco Treatment Counseling Follow Up Visit    Name: Hector Otoole Jr.            MRN: [id number]          YOB: 1955           Age: 70 y.o.                  Today's Date: 6/16/2025  Primary Care Physician: Christopher D'Amico, DO  Referring Physician: No ref. provider found  Program Location: [place] [address] [facility]         Virtual or Telephone Consent    An interactive audio and video telecommunication system which permits real time communications between the patient (at the originating site) and provider (at the distant site) was utilized to provide this telehealth service.   Verbal consent was requested and obtained from Hector Otoole Jr. on this date, 06/16/25 for a telehealth visit and the patient's location was confirmed at the time of the visit.    Start time: 10:32 AM    End time: 10:35 AM     Hector Otoole Jr. presents for follow up session for Tobacco Treatment Counseling. UPDATES: Patient remains smoke-free at this time. He has reached 9-day PPA. He is feeling good both physically and mentally. Denies intense cravings and withdrawal symptoms. Still using nicotine lozenges which are helping.  Will continue to follow for relapse prevention support. Follow up two weeks on 6/30/25.      Christelle Wynn RN

## 2025-06-30 ENCOUNTER — APPOINTMENT (OUTPATIENT)
Dept: CARDIAC REHAB | Facility: HOSPITAL | Age: 70
End: 2025-06-30
Payer: MEDICARE

## 2025-06-30 DIAGNOSIS — F17.210 CIGARETTE SMOKER: ICD-10-CM

## 2025-06-30 PROCEDURE — 99406 BEHAV CHNG SMOKING 3-10 MIN: CPT | Mod: 95 | Performed by: INTERNAL MEDICINE

## 2025-06-30 NOTE — PROGRESS NOTES
Tobacco Treatment Counseling Follow Up Visit    Name: Hector Otoole Jr.            MRN: [id number]          YOB: 1955           Age: 70 y.o.                  Today's Date: 6/30/2025  Primary Care Physician: Christopher D'Amico, DO  Referring Physician: No ref. provider found  Program Location: [place] [address] [facility]         Virtual or Telephone Consent    An interactive audio and video telecommunication system which permits real time communications between the patient (at the originating site) and provider (at the distant site) was utilized to provide this telehealth service.   Verbal consent was requested and obtained from Hector Otoole Jr. on this date, 06/30/25 for a telehealth visit and the patient's location was confirmed at the time of the visit.    Start time: 10:32 AM   End time: 10:36 AM     Hector Otoole Jr. presents for follow up session for Tobacco Treatment Counseling. UPDATES: Patient reports that he is still smoke-free at this time. He has reached 23-day PPA.   He did have one slip-up on Friday. No particular situation that led him to smoke, he just had an intense urge.   Does need more nicotine patches, plans to buy some today. Also advised him to continue with lozenges as well and use 1-2 an hour.   Will continue to follow closely for relapse prevention support. Follow up scheduled 7/14.           Christelle Wynn RN

## 2025-07-14 ENCOUNTER — APPOINTMENT (OUTPATIENT)
Dept: CARDIAC REHAB | Facility: HOSPITAL | Age: 70
End: 2025-07-14
Payer: MEDICARE

## 2025-07-14 DIAGNOSIS — F17.210 CIGARETTE SMOKER: ICD-10-CM

## 2025-07-14 PROCEDURE — 99406 BEHAV CHNG SMOKING 3-10 MIN: CPT | Mod: 95 | Performed by: INTERNAL MEDICINE

## 2025-07-14 NOTE — PROGRESS NOTES
Tobacco Treatment Counseling Follow Up Visit    Name: Hector Otoole Jr.            MRN: [id number]          YOB: 1955           Age: 70 y.o.                  Today's Date: 7/14/2025  Primary Care Physician: Christopher D'Amico, DO  Referring Physician: No ref. provider found  Program Location: [place] [address] [facility]       Virtual or Telephone Consent    An interactive audio and video telecommunication system which permits real time communications between the patient (at the originating site) and provider (at the distant site) was utilized to provide this telehealth service.   Verbal consent was requested and obtained from Hector Otoole Jr. on this date, 07/14/25 for a telehealth visit and the patient's location was confirmed at the time of the visit.    Start time: 10:31 AM    End time: 10:38 AM     Hector Otoole Jr. presents for follow up session for Tobacco Treatment Counseling. UPDATES: Patient reports that he is still smoke-free at this time. He has reached 5 week PPA.   Has not used nicotine patch in about 5 days. Denies cravings and withdrawals. He wants to start tapering off of nicotine lozenges. Only using about 3 per day. Advised him to taper down to 2 for a few days, and then 1. May help to switch to a non-nicotine products for oral fixation, such as mints, cough drops, regular chewing gum, etc.  Follow up two months on 9/8/25 for continued relapse prevention support.       Christelle Wynn RN

## 2025-09-08 ENCOUNTER — APPOINTMENT (OUTPATIENT)
Dept: CARDIAC REHAB | Facility: HOSPITAL | Age: 70
End: 2025-09-08
Payer: MEDICARE

## 2025-10-15 ENCOUNTER — APPOINTMENT (OUTPATIENT)
Dept: CARDIOLOGY | Facility: CLINIC | Age: 70
End: 2025-10-15
Payer: MEDICARE

## 2025-11-20 ENCOUNTER — APPOINTMENT (OUTPATIENT)
Dept: PRIMARY CARE | Facility: CLINIC | Age: 70
End: 2025-11-20
Payer: MEDICARE

## 2026-02-20 ENCOUNTER — APPOINTMENT (OUTPATIENT)
Dept: VASCULAR SURGERY | Facility: CLINIC | Age: 71
End: 2026-02-20
Payer: MEDICARE